# Patient Record
Sex: FEMALE | Race: WHITE | NOT HISPANIC OR LATINO | Employment: UNEMPLOYED | URBAN - METROPOLITAN AREA
[De-identification: names, ages, dates, MRNs, and addresses within clinical notes are randomized per-mention and may not be internally consistent; named-entity substitution may affect disease eponyms.]

---
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
## 2020-03-13 ENCOUNTER — HOSPITAL ENCOUNTER (EMERGENCY)
Facility: CLINIC | Age: 20
Discharge: HOME | End: 2020-03-13
Attending: EMERGENCY MEDICINE | Admitting: EMERGENCY MEDICINE
Payer: COMMERCIAL

## 2020-03-13 ENCOUNTER — APPOINTMENT (OUTPATIENT)
Dept: ULTRASOUND IMAGING | Facility: CLINIC | Age: 20
End: 2020-03-13
Attending: EMERGENCY MEDICINE
Payer: COMMERCIAL

## 2020-03-13 VITALS
RESPIRATION RATE: 16 BRPM | HEART RATE: 89 BPM | DIASTOLIC BLOOD PRESSURE: 76 MMHG | OXYGEN SATURATION: 100 % | TEMPERATURE: 97.4 F | SYSTOLIC BLOOD PRESSURE: 119 MMHG

## 2020-03-13 DIAGNOSIS — R10.32 ABDOMINAL PAIN, LEFT LOWER QUADRANT: ICD-10-CM

## 2020-03-13 DIAGNOSIS — R11.2 NON-INTRACTABLE VOMITING WITH NAUSEA, UNSPECIFIED VOMITING TYPE: ICD-10-CM

## 2020-03-13 LAB
ALBUMIN UR-MCNC: NEGATIVE MG/DL
ANION GAP SERPL CALCULATED.3IONS-SCNC: 3 MMOL/L (ref 3–14)
APPEARANCE UR: CLEAR
B-HCG FREE SERPL-ACNC: <5 IU/L
BASOPHILS # BLD AUTO: 0 10E9/L (ref 0–0.2)
BASOPHILS NFR BLD AUTO: 0.2 %
BILIRUB UR QL STRIP: NEGATIVE
BUN SERPL-MCNC: 15 MG/DL (ref 7–30)
CALCIUM SERPL-MCNC: 8.2 MG/DL (ref 8.5–10.1)
CHLORIDE SERPL-SCNC: 107 MMOL/L (ref 96–110)
CO2 SERPL-SCNC: 29 MMOL/L (ref 20–32)
COLOR UR AUTO: ABNORMAL
CREAT SERPL-MCNC: 0.76 MG/DL (ref 0.5–1)
DIFFERENTIAL METHOD BLD: ABNORMAL
EOSINOPHIL # BLD AUTO: 0.3 10E9/L (ref 0–0.7)
EOSINOPHIL NFR BLD AUTO: 1.7 %
ERYTHROCYTE [DISTWIDTH] IN BLOOD BY AUTOMATED COUNT: 12.6 % (ref 10–15)
GFR SERPL CREATININE-BSD FRML MDRD: >90 ML/MIN/{1.73_M2}
GLUCOSE SERPL-MCNC: 85 MG/DL (ref 70–99)
GLUCOSE UR STRIP-MCNC: NEGATIVE MG/DL
HCT VFR BLD AUTO: 43.3 % (ref 35–47)
HGB BLD-MCNC: 14.1 G/DL (ref 11.7–15.7)
HGB UR QL STRIP: ABNORMAL
IMM GRANULOCYTES # BLD: 0.1 10E9/L (ref 0–0.4)
IMM GRANULOCYTES NFR BLD: 0.4 %
KETONES UR STRIP-MCNC: NEGATIVE MG/DL
LEUKOCYTE ESTERASE UR QL STRIP: NEGATIVE
LYMPHOCYTES # BLD AUTO: 1.8 10E9/L (ref 0.8–5.3)
LYMPHOCYTES NFR BLD AUTO: 12.8 %
MCH RBC QN AUTO: 28.4 PG (ref 26.5–33)
MCHC RBC AUTO-ENTMCNC: 32.6 G/DL (ref 31.5–36.5)
MCV RBC AUTO: 87 FL (ref 78–100)
MONOCYTES # BLD AUTO: 1 10E9/L (ref 0–1.3)
MONOCYTES NFR BLD AUTO: 7.2 %
MUCOUS THREADS #/AREA URNS LPF: PRESENT /LPF
NEUTROPHILS # BLD AUTO: 11.1 10E9/L (ref 1.6–8.3)
NEUTROPHILS NFR BLD AUTO: 77.7 %
NITRATE UR QL: NEGATIVE
NRBC # BLD AUTO: 0 10*3/UL
NRBC BLD AUTO-RTO: 0 /100
PH UR STRIP: 6 PH (ref 5–7)
PLATELET # BLD AUTO: 313 10E9/L (ref 150–450)
POTASSIUM SERPL-SCNC: 3.7 MMOL/L (ref 3.4–5.3)
RBC # BLD AUTO: 4.97 10E12/L (ref 3.8–5.2)
RBC #/AREA URNS AUTO: 1 /HPF (ref 0–2)
SODIUM SERPL-SCNC: 139 MMOL/L (ref 133–144)
SOURCE: ABNORMAL
SP GR UR STRIP: 1.02 (ref 1–1.03)
SQUAMOUS #/AREA URNS AUTO: 12 /HPF (ref 0–1)
UROBILINOGEN UR STRIP-MCNC: NORMAL MG/DL (ref 0–2)
WBC # BLD AUTO: 14.4 10E9/L (ref 4–11)
WBC #/AREA URNS AUTO: 1 /HPF (ref 0–5)

## 2020-03-13 PROCEDURE — 80048 BASIC METABOLIC PNL TOTAL CA: CPT | Performed by: EMERGENCY MEDICINE

## 2020-03-13 PROCEDURE — 25000128 H RX IP 250 OP 636: Performed by: EMERGENCY MEDICINE

## 2020-03-13 PROCEDURE — 76856 US EXAM PELVIC COMPLETE: CPT

## 2020-03-13 PROCEDURE — 81001 URINALYSIS AUTO W/SCOPE: CPT | Performed by: EMERGENCY MEDICINE

## 2020-03-13 PROCEDURE — 25800030 ZZH RX IP 258 OP 636: Performed by: EMERGENCY MEDICINE

## 2020-03-13 PROCEDURE — 85025 COMPLETE CBC W/AUTO DIFF WBC: CPT | Performed by: EMERGENCY MEDICINE

## 2020-03-13 PROCEDURE — 96361 HYDRATE IV INFUSION ADD-ON: CPT

## 2020-03-13 PROCEDURE — 99285 EMERGENCY DEPT VISIT HI MDM: CPT | Mod: 25

## 2020-03-13 PROCEDURE — 96374 THER/PROPH/DIAG INJ IV PUSH: CPT

## 2020-03-13 PROCEDURE — 84702 CHORIONIC GONADOTROPIN TEST: CPT

## 2020-03-13 RX ORDER — ONDANSETRON 2 MG/ML
4 INJECTION INTRAMUSCULAR; INTRAVENOUS ONCE
Status: COMPLETED | OUTPATIENT
Start: 2020-03-13 | End: 2020-03-13

## 2020-03-13 RX ORDER — ONDANSETRON 4 MG/1
4 TABLET, ORALLY DISINTEGRATING ORAL EVERY 6 HOURS PRN
Qty: 10 TABLET | Refills: 0 | Status: SHIPPED | OUTPATIENT
Start: 2020-03-13 | End: 2020-03-16

## 2020-03-13 RX ORDER — ONDANSETRON 2 MG/ML
INJECTION INTRAMUSCULAR; INTRAVENOUS
Status: DISCONTINUED
Start: 2020-03-13 | End: 2020-03-13 | Stop reason: HOSPADM

## 2020-03-13 RX ADMIN — SODIUM CHLORIDE 1000 ML: 9 INJECTION, SOLUTION INTRAVENOUS at 01:04

## 2020-03-13 RX ADMIN — ONDANSETRON 4 MG: 2 INJECTION INTRAMUSCULAR; INTRAVENOUS at 01:04

## 2020-03-13 ASSESSMENT — ENCOUNTER SYMPTOMS
ABDOMINAL PAIN: 1
FEVER: 0
DIARRHEA: 0
VOMITING: 1
NAUSEA: 1
DIFFICULTY URINATING: 0
DYSURIA: 0

## 2020-03-13 NOTE — DISCHARGE INSTRUCTIONS
Discharge Instructions  Abdominal Pain    Abdominal pain (belly pain) can be caused by many things. Your evaluation today does not show the exact cause for your pain. Your provider today has decided that it is unlikely your pain is due to a life threatening problem, or a problem requiring surgery or hospital admission. Sometimes those problems cannot be found right away, so it is very important that you follow up as directed.  Sometimes only the changes which occur over time allow the cause of your pain to be found.    Generally, every Emergency Department visit should have a follow-up clinic visit with either a primary or a specialty clinic/provider. Please follow-up as instructed by your emergency provider today. With abdominal pain, we often recommend very close follow-up, such as the following day.    ADULTS:  Return to the Emergency Department right away if:    You get an oral temperature above 102oF or as directed by your provider.  You have blood in your stools. This may be bright red or appear as black, tarry stools.    You keep vomiting (throwing up) or cannot drink liquids.  You see blood when you vomit.   You cannot have a bowel movement or you cannot pass gas.  Your stomach gets bloated or bigger.  Your skin or the whites of your eyes look yellow.  You faint.  You have bloody, frequent or painful urination (peeing).  You have new symptoms or anything that worries you.    MORE INFORMATION:    Appendicitis:  A possible cause of abdominal pain in any person who still has their appendix is acute appendicitis. Appendicitis is often hard to diagnose.  Testing does not always rule out early appendicitis or other causes of abdominal pain. Close follow-up with your provider and re-evaluations may be needed to figure out the reason for your abdominal pain.    Follow-up:  It is very important that you make an appointment with your clinic and go to the appointment.  If you do not follow-up with your primary  "provider, it may result in missing an important development which could result in permanent injury or disability and/or lasting pain.  If there is any problem keeping your appointment, call your provider or return to the Emergency Department.    Medications:  Take your medications as directed by your provider today.  Before using over-the-counter medications, ask your provider and make sure to take the medications as directed.  If you have any questions about medications, ask your provider.    Diet:  Resume your normal diet as much as possible, but do not eat fried, fatty or spicy foods while you have pain.  Do not drink alcohol or have caffeine.  Do not smoke tobacco.    Probiotics: If you have been given an antibiotic, you may want to also take a probiotic pill or eat yogurt with live cultures. Probiotics have \"good bacteria\" to help your intestines stay healthy. Studies have shown that probiotics help prevent diarrhea (loose stools) and other intestine problems (including C. diff infection) when you take antibiotics. You can buy these without a prescription in the pharmacy section of the store.     If you were given a prescription for medicine here today, be sure to read all of the information (including the package insert) that comes with your prescription.  This will include important information about the medicine, its side effects, and any warnings that you need to know about.  The pharmacist who fills the prescription can provide more information and answer questions you may have about the medicine.  If you have questions or concerns that the pharmacist cannot address, please call or return to the Emergency Department.       Remember that you can always come back to the Emergency Department if you are not able to see your regular provider in the amount of time listed above, if you get any new symptoms, or if there is anything that worries you.    "

## 2020-03-13 NOTE — ED PROVIDER NOTES
History     Chief Complaint:  Abdominal Pain    The history is provided by the patient.      Gibson Prakash is a 19 year old female with a history of methamphetamine use and bipolar who presents with abdominal pain. According to the patient, earlier today she began experiencing centralized lower abdominal pain that feels like a pressure and has been intermittent since onset. She recently had her nexplanon removed, and took two pregnancy tests today, but both showed question marks. She reports bleeding for several days after removal of the nexplanon, but has not had regular periods in a long time. She denies urinary abnormalities, fevers and diarrhea, but reports one episode of vomiting. The patient says she is a meth user, but has not used in 24 hours.     Allergies:  No Known Allergies     Medications:    The patient is currently on no regular medications.     Past Medical History:    IV drug user  Depression  ADHD  Meth abuse  Asthma  Bipolar 1 disorder    Past Surgical History:    The patient does not have any pertinent past surgical history.    Family History:    Substance abuse  Depression  Anxiety  Liver disease  Bipolar disorder  Heart disease    Social History:  Smoking status: current everyday smoker  Alcohol use: no  Drug use: yes, methamphetamines  Presents to the ED with fiance  Marital Status:  Single     Review of Systems   Constitutional: Negative for fever.   Gastrointestinal: Positive for abdominal pain, nausea and vomiting. Negative for diarrhea.   Genitourinary: Positive for vaginal bleeding. Negative for difficulty urinating and dysuria.   All other systems reviewed and are negative.      Physical Exam     Patient Vitals for the past 24 hrs:   BP Temp Temp src Heart Rate Resp SpO2   03/13/20 0305 119/76 -- -- -- 16 100 %   03/13/20 0200 -- -- -- 85 19 --   03/13/20 0145 -- -- -- 84 11 --   03/13/20 0130 -- -- -- 79 17 99 %   03/13/20 0115 -- -- -- 80 16 98 %   03/13/20 0032 107/88 97.4  F  (36.3  C) Oral 89 16 98 %     Physical Exam  Nursing note and vitals reviewed.  Constitutional: Cooperative.   HENT:   Mouth/Throat: Mucous membranes are normal.   Cardiovascular: Normal rate, regular rhythm and normal heart sounds.  No murmur.  Pulmonary/Chest: Effort normal and breath sounds normal. No respiratory distress. No wheezes. No rales.   Abdominal: Soft. Normal appearance and bowel sounds are normal. No distension. Mild LLQ tenderness.  There is no rigidity and no guarding.   Neurological: Alert.   Skin: Skin is warm and dry  Psychiatric: Normal mood and affect.     Emergency Department Course     Imaging:  Radiology findings were communicated with the patient who voiced understanding of the findings.    US Pelvic Complete w Transvaginal & Abd/Pel Duplex Limited:  1.  No overt uterine or endometrial abnormality. Endometrial thickness normal at 4 mm.   2.  Neither ovary visualized due to bowel gas.   3.  No significant free fluid in the pelvis, as per radiology.      Laboratory:  Laboratory findings were communicated with the patient who voiced understanding of the findings.    CBC: WBC: 14.4 (H), HGB: 14.1, PLT: 313  BMP: Calcium 8.2 (L) o/w WNL (Creatinine 0.76)    ISTAT HCG Quantitative Pregnancy POCT <5.0     UA with Microscopic: blood large (A), squamous epithelial/HPF 12 (H), mucous present (A), o/w WNL      Interventions:  0104 NS 1L IV   0104 Zofran 4 mg IV    Emergency Department Course:  Past medical records, nursing notes, and vitals reviewed.    0037 I performed an exam of the patient as documented above.     IV was inserted and blood was drawn for laboratory testing, results above.    The patient provided a urine sample here in the emergency department. This was sent for laboratory testing, findings above.    The patient was sent for a pelvic US while in the emergency department, results above.     0305 Patient rechecked and updated.      Findings and plan explained to the Patient. Patient  discharged home with instructions regarding supportive care, medications, and reasons to return. The importance of close follow-up was reviewed. The patient was prescribed Zofran.      I personally reviewed the laboratory and imaging results with the Patient and answered all related questions prior to discharge.      Impression & Plan     Medical Decision Making:  Gibson Prakash is a 19 year old female with ongoing methamphetamine abuse who presents to the emergency department today with vomiting and left lower quadrant abdominal pain. She is concerned about pregnancy, but here the pregnancy test is negative. Urinalysis is negative, as is the ultrasound of her ovaries. No evidence of torsion or ruptured cysts. Labs other than a slightly leukocytosis are reassuring. After fluids and Zofran she feels better and would like to go home. This may simply be a viral enteritis with the vomiting. If she develops any worsening symptoms she is certainly welcome to return, but at this time I have a low clinical suspicion for a an acute surgical process. Comfortable with plan for discharge.      Discharge Diagnosis:    ICD-10-CM    1. Abdominal pain, left lower quadrant  R10.32    2. Non-intractable vomiting with nausea, unspecified vomiting type  R11.2        Disposition:  Discharged to home.       Discharge Medications:  New Prescriptions    ONDANSETRON (ZOFRAN ODT) 4 MG ODT TAB    Take 1 tablet (4 mg) by mouth every 6 hours as needed for nausea       Scribe Disclosure:  I, Aleja Lucas, am serving as a scribe at 12:37 AM on 3/13/2020 to document services personally performed by Epifanio Garcia MD based on my observations and the provider's statements to me.    3/13/2020   [facility] EMERGENCY DEPARTMENT       Epifanio Garcia MD  03/13/20 0530

## 2020-03-13 NOTE — ED AVS SNAPSHOT
[facility] Emergency Department  [address]  [facility] [address]  Phone:  [phone]  Fax:  [phone]                                    Gibson Prakash   MRN: [id number]    Department:  [facility] Emergency Department   Date of Visit:  3/13/2020           After Visit Summary Signature Page    I have received my discharge instructions, and my questions have been answered. I have discussed any challenges I see with this plan with the nurse or doctor.    ..........................................................................................................................................  Patient/Patient Representative Signature      ..........................................................................................................................................  Patient Representative Print Name and Relationship to Patient    ..................................................               ................................................  Date                                   Time    ..........................................................................................................................................  Reviewed by Signature/Title    ...................................................              ..............................................  Date                                               Time          22EPIC Rev 08/18

## 2020-03-13 NOTE — ED TRIAGE NOTES
Pt in with C/O LLQ pain onset tonight. Pt reports + nausea and episode of vomiting PTA. Pt reports she took a pregnancy test which was inconclusive.

## 2021-07-14 ENCOUNTER — TRANSFERRED RECORDS (OUTPATIENT)
Dept: HEALTH INFORMATION MANAGEMENT | Facility: CLINIC | Age: 21
End: 2021-07-14
Payer: COMMERCIAL

## 2021-07-14 LAB
C TRACH DNA SPEC QL PROBE+SIG AMP: NEGATIVE
HPV ABSTRACT: NORMAL
N GONORRHOEA DNA SPEC QL PROBE+SIG AMP: NEGATIVE
PAP-ABSTRACT: NORMAL
SPECIMEN DESCRIP: NORMAL
SPECIMEN DESCRIPTION: NORMAL

## 2022-01-02 ENCOUNTER — HOSPITAL ENCOUNTER (EMERGENCY)
Facility: CLINIC | Age: 22
Discharge: HOME | End: 2022-01-02
Attending: FAMILY MEDICINE | Admitting: FAMILY MEDICINE
Payer: COMMERCIAL

## 2022-01-02 VITALS
HEIGHT: 68 IN | SYSTOLIC BLOOD PRESSURE: 111 MMHG | DIASTOLIC BLOOD PRESSURE: 62 MMHG | RESPIRATION RATE: 16 BRPM | WEIGHT: 180 LBS | BODY MASS INDEX: 27.28 KG/M2 | HEART RATE: 78 BPM | OXYGEN SATURATION: 100 % | TEMPERATURE: 98.2 F
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
## 2022-01-02 DIAGNOSIS — S09.90XA CLOSED HEAD INJURY, INITIAL ENCOUNTER: ICD-10-CM

## 2022-01-02 DIAGNOSIS — Z33.1 PREGNANCY, INCIDENTAL: ICD-10-CM

## 2022-01-02 PROCEDURE — 99283 EMERGENCY DEPT VISIT LOW MDM: CPT | Mod: 25 | Performed by: FAMILY MEDICINE

## 2022-01-02 PROCEDURE — 76604 US EXAM CHEST: CPT | Performed by: FAMILY MEDICINE

## 2022-01-02 PROCEDURE — 76604 US EXAM CHEST: CPT | Mod: 26 | Performed by: FAMILY MEDICINE

## 2022-01-02 PROCEDURE — 76705 ECHO EXAM OF ABDOMEN: CPT | Mod: 26 | Performed by: FAMILY MEDICINE

## 2022-01-02 PROCEDURE — 99284 EMERGENCY DEPT VISIT MOD MDM: CPT | Mod: 25 | Performed by: FAMILY MEDICINE

## 2022-01-02 PROCEDURE — 76705 ECHO EXAM OF ABDOMEN: CPT | Performed by: FAMILY MEDICINE

## 2022-01-02 ASSESSMENT — ENCOUNTER SYMPTOMS
SINUS PRESSURE: 0
HEADACHES: 1
SORE THROAT: 0
NAUSEA: 0
DYSURIA: 0
ABDOMINAL PAIN: 0
DIAPHORESIS: 0
VOMITING: 0
DIARRHEA: 0
PALPITATIONS: 0
FREQUENCY: 0
WHEEZING: 0
BLOOD IN STOOL: 0
FEVER: 0
CHILLS: 0
CONSTIPATION: 0
COUGH: 0
SHORTNESS OF BREATH: 0

## 2022-01-02 ASSESSMENT — MIFFLIN-ST. JEOR: SCORE: 1629.97
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
## 2022-02-10 ENCOUNTER — TELEPHONE (OUTPATIENT)
Dept: OBGYN | Facility: CLINIC | Age: 22
End: 2022-02-10
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
## 2022-02-16 ENCOUNTER — APPOINTMENT (OUTPATIENT)
Dept: OBGYN | Facility: CLINIC | Age: 22
End: 2022-02-16
Payer: COMMERCIAL

## 2022-02-16 ENCOUNTER — PRENATAL OFFICE VISIT (OUTPATIENT)
Dept: OBGYN | Facility: CLINIC | Age: 22
End: 2022-02-16

## 2022-02-16 DIAGNOSIS — Z34.00 PRENATAL CARE, FIRST PREGNANCY: ICD-10-CM

## 2022-02-16 PROCEDURE — 99207 PR NO CHARGE NURSE ONLY: CPT | Performed by: OBSTETRICS & GYNECOLOGY

## 2022-02-16 RX ORDER — PRENATAL VIT/IRON FUM/FOLIC AC 27MG-0.8MG
1 TABLET ORAL DAILY
COMMUNITY
End: 2022-09-02

## 2022-02-17 ENCOUNTER — PRENATAL OFFICE VISIT (OUTPATIENT)
Dept: OBGYN | Facility: CLINIC | Age: 22
End: 2022-02-17
Payer: COMMERCIAL

## 2022-02-17 VITALS
DIASTOLIC BLOOD PRESSURE: 74 MMHG | HEIGHT: 68 IN | WEIGHT: 191.3 LBS | RESPIRATION RATE: 12 BRPM | HEART RATE: 82 BPM | SYSTOLIC BLOOD PRESSURE: 120 MMHG | BODY MASS INDEX: 28.99 KG/M2 | TEMPERATURE: 98.4 F

## 2022-02-17 DIAGNOSIS — N89.8 VAGINAL DISCHARGE: ICD-10-CM

## 2022-02-17 DIAGNOSIS — Z34.02 ENCOUNTER FOR PRENATAL CARE IN SECOND TRIMESTER OF FIRST PREGNANCY: Primary | ICD-10-CM

## 2022-02-17 DIAGNOSIS — N76.0 BV (BACTERIAL VAGINOSIS): Primary | ICD-10-CM

## 2022-02-17 DIAGNOSIS — B96.89 BV (BACTERIAL VAGINOSIS): Primary | ICD-10-CM

## 2022-02-17 LAB
AMPHETAMINES UR QL SCN: NORMAL
AMPHETAMINES UR QL: NOT DETECTED
BARBITURATES UR QL SCN: NOT DETECTED
BARBITURATES UR QL: NORMAL
BENZODIAZ UR QL SCN: NOT DETECTED
BENZODIAZ UR QL: NORMAL
BUPRENORPHINE UR QL: NOT DETECTED
CANNABINOIDS UR QL SCN: NORMAL
CANNABINOIDS UR QL: NOT DETECTED
CLUE CELLS: PRESENT
COCAINE UR QL SCN: NOT DETECTED
COCAINE UR QL: NORMAL
D-METHAMPHET UR QL: NOT DETECTED
METHADONE UR QL SCN: NOT DETECTED
OPIATES UR QL SCN: NORMAL
OPIATES UR QL SCN: NOT DETECTED
OXYCODONE UR QL SCN: NOT DETECTED
PCP UR QL SCN: NORMAL
PCP UR QL SCN: NOT DETECTED
PROPOXYPH UR QL: NOT DETECTED
TRICHOMONAS, WET PREP: ABNORMAL
TRICYCLICS UR QL SCN: NOT DETECTED
WBC'S/HIGH POWER FIELD, WET PREP: ABNORMAL
YEAST, WET PREP: ABNORMAL

## 2022-02-17 PROCEDURE — 81511 FTL CGEN ABNOR FOUR ANAL: CPT | Mod: 90 | Performed by: OBSTETRICS & GYNECOLOGY

## 2022-02-17 PROCEDURE — 87491 CHLMYD TRACH DNA AMP PROBE: CPT | Performed by: OBSTETRICS & GYNECOLOGY

## 2022-02-17 PROCEDURE — 99207 PR FIRST OB VISIT: CPT | Performed by: OBSTETRICS & GYNECOLOGY

## 2022-02-17 PROCEDURE — 80306 DRUG TEST PRSMV INSTRMNT: CPT | Performed by: OBSTETRICS & GYNECOLOGY

## 2022-02-17 PROCEDURE — 87389 HIV-1 AG W/HIV-1&-2 AB AG IA: CPT | Performed by: OBSTETRICS & GYNECOLOGY

## 2022-02-17 PROCEDURE — 87591 N.GONORRHOEAE DNA AMP PROB: CPT | Performed by: OBSTETRICS & GYNECOLOGY

## 2022-02-17 PROCEDURE — 87210 SMEAR WET MOUNT SALINE/INK: CPT | Performed by: OBSTETRICS & GYNECOLOGY

## 2022-02-17 PROCEDURE — 36415 COLL VENOUS BLD VENIPUNCTURE: CPT | Performed by: OBSTETRICS & GYNECOLOGY

## 2022-02-17 PROCEDURE — 99000 SPECIMEN HANDLING OFFICE-LAB: CPT | Performed by: OBSTETRICS & GYNECOLOGY

## 2022-02-17 PROCEDURE — 80307 DRUG TEST PRSMV CHEM ANLYZR: CPT | Performed by: OBSTETRICS & GYNECOLOGY

## 2022-02-17 PROCEDURE — 86803 HEPATITIS C AB TEST: CPT | Performed by: OBSTETRICS & GYNECOLOGY

## 2022-02-17 RX ORDER — METRONIDAZOLE 7.5 MG/G
1 GEL VAGINAL AT BEDTIME
Qty: 25 G | Refills: 0 | Status: SHIPPED | OUTPATIENT
Start: 2022-02-17 | End: 2022-02-22

## 2022-02-17 NOTE — NURSING NOTE
"Initial /74 (BP Location: Right arm, Patient Position: Sitting, Cuff Size: Adult Large)   Pulse 82   Temp 98.4  F (36.9  C) (Tympanic)   Resp 12   Ht 1.727 m (5' 8\")   Wt 86.8 kg (191 lb 4.8 oz)   LMP 10/31/2021   BMI 29.09 kg/m   Estimated body mass index is 29.09 kg/m  as calculated from the following:    Height as of this encounter: 1.727 m (5' 8\").    Weight as of this encounter: 86.8 kg (191 lb 4.8 oz). .      "

## 2022-02-17 NOTE — LETTER
[facility] [place]  [address] [place] [address]  Phone: [phone]      February 18, 2022      Gibson Prakash  [address]  [place] [address]              To whom it may concern:    Gibson Prakash is being seen in our clinic for prenatal care.  Her Estimated Date of Delivery: Jul 15, 2022.  Her Last menstrual period was Patient's last menstrual period was 10/31/2021..      Sincerely,    Michelle Card DO

## 2022-02-17 NOTE — PROGRESS NOTES
Patient is transfer from [facility] She has asked for her records to be faxed to us Patient stated she ws to be inpatient for drug abuse ( meth) for 4- 7 weeks but left yesterday after just 1 week She stated something about doing a outpatient program ? She said her last use of Meth was 1/16/22  [facility] OB Intake Nurse    Patient supplied answers from flow sheet for:  Prenatal OB Questionnaire.  Past Medical History  Have you ever recieved care for your mental health? : (!) Yes  Have you ever been in a major accident or suffered serious trauma?: No  Within the last year, has anyone hit, slapped, kicked or otherwise hurt you?: No  In the last year, has anyone forced you to have sex when you didn't want to?: No    Past Medical History 2   Have you ever received a blood transfusion?: No  Would you accept a blood transfusion if was medically recommended?: Yes  Does anyone in your home smoke?: (!) Yes (patient)   Is your blood type Rh negative?: No  Have you ever ?: No  Have you been hospitalized for a nonsurgical reason excluding normal delivery?: (!) Yes (for substance abuse)  Have you ever had an abnormal pap smear?: No    Past Medical History (Continued)  Do you have a history of abnormalities of the uterus?: No  Did your mother take LETICIA or any other hormones when she was pregnant with you?: No  Do you have any other problems we have not asked about which you feel may be important to this pregnancy?: No

## 2022-02-18 ENCOUNTER — HOSPITAL ENCOUNTER (OUTPATIENT)
Dept: ULTRASOUND IMAGING | Facility: CLINIC | Age: 22
Discharge: HOME | End: 2022-02-18
Attending: OBSTETRICS & GYNECOLOGY | Admitting: OBSTETRICS & GYNECOLOGY
Payer: COMMERCIAL

## 2022-02-18 DIAGNOSIS — Z34.02 ENCOUNTER FOR PRENATAL CARE IN SECOND TRIMESTER OF FIRST PREGNANCY: ICD-10-CM

## 2022-02-18 LAB
C TRACH DNA SPEC QL PROBE+SIG AMP: NEGATIVE
HCV AB SERPL QL IA: NONREACTIVE
HIV 1+2 AB+HIV1 P24 AG SERPL QL IA: NONREACTIVE
N GONORRHOEA DNA SPEC QL NAA+PROBE: NEGATIVE

## 2022-02-18 PROCEDURE — 76805 OB US >/= 14 WKS SNGL FETUS: CPT

## 2022-02-18 NOTE — RESULT ENCOUNTER NOTE
Will forward to [facility] pool for pt notification of normal result.    Radha Green   Ob/Gyn Clinic  RN

## 2022-02-18 NOTE — PROGRESS NOTES
[facility] OB/GYN Clinic    New OB Visit Note    CC: New OB     Subjective:    Gibson is a 21 year old  at 18w6d by 9w5d US who presents for her initial OB visit. Patient had an initial prenatal intake at [facility] but is transferring care. She reports she has been feeling well. Denies any vaginal bleeding, uterine cramping, or leakage of fluid. Not definitively feeling any fetal movement. Only concern today is some clear vaginal discharge with foul odor.     Past OB History:     OB History    Para Term  AB Living   1 0 0 0 0 0   SAB IAB Ectopic Multiple Live Births   0 0 0 0 0      # Outcome Date GA Lbr [place]/2nd Weight Sex Delivery Anes PTL Lv   1 Current                Past Medical History:   Diagnosis Date     ADHD      Anxiety      Asthma      Bipolar disorder      Depression      History of urinary tract infection      IV drug user      Methamphetamine abuse      Oppositional defiant disorder        Past Surgical History:   Procedure Laterality Date     NO HISTORY OF SURGERY         Current Outpatient Medications   Medication Sig Dispense Refill     Prenatal Vit-Fe Fumarate-FA (PRENATAL MULTIVITAMIN W/IRON) 27-0.8 MG tablet Take 1 tablet by mouth daily (Patient not taking: Reported on 2022)         Family History   Problem Relation Age of Onset     Substance Abuse Mother         drugs     Depression Mother      Anxiety Disorder Mother      Substance Abuse Father         drugs     Depression Father      Heart Disease Maternal Grandmother         MIx3     Substance Abuse Paternal Grandmother         alcohol     Substance Abuse Paternal Grandfather         alcohol     Liver Disease Paternal Grandfather      Alcoholism Paternal Grandfather      Substance Abuse Maternal Uncle      Depression Maternal Uncle      Anxiety Disorder Maternal Uncle      Bipolar Disorder Maternal Uncle      Substance Abuse Paternal Uncle        Social History     Tobacco Use     Smoking status:  "Current Every Day Smoker     Packs/day: 1.00     Types: Cigarettes     Smokeless tobacco: Never Used     Tobacco comment: down to 2-3 cig/day or vaping ? with pregnancy   Substance Use Topics     Alcohol use: No       ROS: A 10 pt ROS was completed and found to be negative unless mentioned in the HPI.     Objective:    /74 (BP Location: Right arm, Patient Position: Sitting, Cuff Size: Adult Large)   Pulse 82   Temp 98.4  F (36.9  C) (Tympanic)   Resp 12   Ht 1.727 m (5' 8\")   Wt 86.8 kg (191 lb 4.8 oz)   LMP 10/31/2021   BMI 29.09 kg/m      Estimated body mass index is 29.09 kg/m  as calculated from the following:    Height as of this encounter: 1.727 m (5' 8\").    Weight as of this encounter: 86.8 kg (191 lb 4.8 oz).    General appearance: well-hydrated, A&O x 3, no apparent distress  Lungs: Equal expansion bilaterally, no accessory muscle use  Heart: No heaves or thrills. No peripheral varicosities  Constitutional: See vitals  Abdomen: Soft, non-tender, non-distended. No rebound, rigidity, or guarding.  Extremities: no edema  Genitourinary:  External genitalia: no erythema, no lesions.   Anus and Perineum: Unremarkable, no visible lesions  Vagina: Normal, healthy pink mucosa without any lesions. Physiologic vaginal discharge.   Cervix: normal appearance, no cervical motion tenderness.       FHT: 150bpm     Assessment and Plan:     Encounter Diagnoses   Name Primary?     Encounter for prenatal care in second trimester of first pregnancy      Vaginal discharge Yes       21 year old  at 18w6d by 9w5d US who presents for her initial OB visit.     -Dating by 9w5d US, unknown LMP  -Some NOB labs obtained at initial intake with [facility]. Will complete collection today. UDS for history of meth use.  -Genetic screening: quad screen ordered today  -Anatomy US ordered  -Pelvic cultures for complaint of vaginal discharge    -History of substance abuse: primarily meth, reports she has been sober since " finding out she was pregnant. UDS today. Reports she follows with [facility] for probation. CPS is involved in her care. She has been submitting monthly drug screens. Has been enrolled in a substance abuse rehab program which is starting next week.   -E-cig use: discussed risks of smoking in pregnancy, she has been cutting back.      Return to clinic in 4 weeks.       Michelle Card DO

## 2022-02-20 LAB
# FETUSES US: NORMAL
AFP MOM SERPL: 1
AFP SERPL-MCNC: 44 NG/ML
AGE - REPORTED: 22.1 YR
CURRENT SMOKER: YES
FAMILY MEMBER DISEASES HX: NO
GA METHOD: NORMAL
GA: NORMAL WK
HCG MOM SERPL: 1.56
HCG SERPL-ACNC: NORMAL IU/L
HX OF HEREDITARY DISORDERS: NO
IDDM PATIENT QL: NO
INHIBIN A MOM SERPL: 0.85
INHIBIN A SERPL-MCNC: 163 PG/ML
INTEGRATED SCN PATIENT-IMP: NORMAL
PATHOLOGY STUDY: NORMAL
SPECIMEN DRAWN SERPL: NORMAL
U ESTRIOL MOM SERPL: 1.27
U ESTRIOL SERPL-MCNC: 2.13 NG/ML

## 2022-03-08 ENCOUNTER — TELEPHONE (OUTPATIENT)
Dept: OBGYN | Facility: CLINIC | Age: 22
End: 2022-03-08
Payer: COMMERCIAL

## 2022-03-16 ENCOUNTER — PRENATAL OFFICE VISIT (OUTPATIENT)
Dept: OBGYN | Facility: CLINIC | Age: 22
End: 2022-03-16
Payer: COMMERCIAL

## 2022-03-16 VITALS
TEMPERATURE: 97.7 F | RESPIRATION RATE: 18 BRPM | HEART RATE: 97 BPM | BODY MASS INDEX: 29.8 KG/M2 | HEIGHT: 68 IN | DIASTOLIC BLOOD PRESSURE: 65 MMHG | SYSTOLIC BLOOD PRESSURE: 115 MMHG | WEIGHT: 196.6 LBS

## 2022-03-16 DIAGNOSIS — Z34.03 ENCOUNTER FOR PRENATAL CARE IN THIRD TRIMESTER OF FIRST PREGNANCY: Primary | ICD-10-CM

## 2022-03-16 PROCEDURE — 99207 PR PRENATAL VISIT: CPT | Performed by: ADVANCED PRACTICE MIDWIFE

## 2022-03-16 NOTE — PROGRESS NOTES
"Here with \"baby daddy.\"  Feeling well.  Baby is active. Denies any leaking of fluid, vaginal bleeding, regular uterine contractions, or headaches or other concerns.  Discussed GCT and labs at next visit.    Reviewed to call for contractions, loss of fluid, vaginal bleeding, decreased fetal movement or any other questions or concerns.    RTC in 4 weeks.  Ayde Lokcett, ROCHELLE, APRN, CNM               "

## 2022-03-29 ENCOUNTER — OFFICE VISIT (OUTPATIENT)
Dept: URGENT CARE | Facility: URGENT CARE | Age: 22
End: 2022-03-29
Payer: COMMERCIAL

## 2022-03-29 VITALS
OXYGEN SATURATION: 99 % | WEIGHT: 206.2 LBS | RESPIRATION RATE: 22 BRPM | HEART RATE: 83 BPM | DIASTOLIC BLOOD PRESSURE: 69 MMHG | BODY MASS INDEX: 31.35 KG/M2 | TEMPERATURE: 97 F | SYSTOLIC BLOOD PRESSURE: 101 MMHG

## 2022-03-29 DIAGNOSIS — M54.50 ACUTE LEFT-SIDED LOW BACK PAIN WITHOUT SCIATICA: Primary | ICD-10-CM

## 2022-03-29 LAB
ALBUMIN UR-MCNC: NEGATIVE MG/DL
AMORPH CRY #/AREA URNS HPF: ABNORMAL /HPF
APPEARANCE UR: ABNORMAL
BACTERIA #/AREA URNS HPF: ABNORMAL /HPF
BILIRUB UR QL STRIP: NEGATIVE
COLOR UR AUTO: YELLOW
GLUCOSE UR STRIP-MCNC: NEGATIVE MG/DL
HGB UR QL STRIP: NEGATIVE
KETONES UR STRIP-MCNC: NEGATIVE MG/DL
LEUKOCYTE ESTERASE UR QL STRIP: NEGATIVE
NITRATE UR QL: NEGATIVE
PH UR STRIP: 7 [PH] (ref 5–7)
RBC #/AREA URNS AUTO: ABNORMAL /HPF
SP GR UR STRIP: 1.02 (ref 1–1.03)
SQUAMOUS #/AREA URNS AUTO: ABNORMAL /LPF
UROBILINOGEN UR STRIP-ACNC: 0.2 E.U./DL
WBC #/AREA URNS AUTO: ABNORMAL /HPF

## 2022-03-29 PROCEDURE — 99213 OFFICE O/P EST LOW 20 MIN: CPT | Performed by: STUDENT IN AN ORGANIZED HEALTH CARE EDUCATION/TRAINING PROGRAM

## 2022-03-29 PROCEDURE — 81001 URINALYSIS AUTO W/SCOPE: CPT | Performed by: STUDENT IN AN ORGANIZED HEALTH CARE EDUCATION/TRAINING PROGRAM

## 2022-03-29 NOTE — LETTER
[facility] CARE [address] [address]  Phone: [phone]    March 29, 2022        Gibson Prakash  [address]  [place] [address]          To whom it may concern:    RE: Gibson Prakash    Patient was seen and treated today at our clinic for left low back pain due to muscle strain.     Please contact me for questions or concerns.      Sincerely,        PHYLLIS Fitzpatrick CNP

## 2022-03-29 NOTE — PROGRESS NOTES
Assessment & Plan     1. Acute left-sided low back pain without sciatica  Patient reports a 3 day history of flank pain, negative for CVA tenderness, dysuria, fever and chills. Due to patients current pregnancy urinary tract infection needs to be ruled out. UA negative for infection. Assessment shows reproducible left sided lower back pain that increases with bending and laying flat in bed suggesting MSK etiology. Discussed at length how the body changes and stretches during pregnancy, that feeling aches and pains is very normal especially as the baby grows later in the 2nd and 3rd trimester. Encouraged patient to buy a belly band, use warm application and use tylenol for pain management. Also suggested gental stretching as tolerated. Patient stated understanding and will follow up with OB as needed.  - UA with Microscopic reflex to Culture - Clinic Collect  - Urine Microscopic         No follow-ups on file.    Danielle Mckoy, PHYLLIS [facility] is a 21 year old female who presents to clinic today for the following health issues:  Chief Complaint   Patient presents with     Kidney Problem     pretty sure have kidney infeciton d/t having back pain for a couple days now     HPI    Patient reports a 3 day history of left sided lower back pain, she states that it hurst worse when she is laying down and sometimes her boyfriend has to help her get up out of bed because it hurst so much. She denies vaginal or urinary pain.  Back Pain    Onset of symptoms was 3 day(s) ago.  Location: left low back  Radiation: does not radiate  Context:       The injury happened while N/A      Mechanism: none recalled by the patient      Patient experienced N/A  Course of symptoms is same.    Severity moderate  Current and Associated symptoms: pain  Denies: fecal incontinence, urinary incontinence, lower extremity numbness, lower extremity weakness and paresthesia     Aggravating Factors: lying  Therapies to improve symptoms include: none tried  Past history: no prior back problems  Patient is currently 25w2d    Review of Systems  Constitutional, HEENT, cardiovascular, pulmonary, gi and gu systems are negative, except as otherwise noted.      Objective    /69 (BP Location: Left arm, Patient Position: Sitting, Cuff Size: Adult Large)   Pulse 83   Temp 97  F (36.1  C) (Tympanic)   Resp 22   Wt 93.5 kg (206 lb 3.2 oz)   LMP 10/31/2021   SpO2 99%   BMI 31.35 kg/m    Physical Exam   GENERAL: healthy, alert and no distress  NECK: no adenopathy, no asymmetry, masses, or scars and thyroid normal to palpation  RESP: lungs clear to auscultation - no rales, rhonchi or wheezes  CV: regular rate and rhythm, normal S1 S2, no S3 or S4, no murmur, click or rub, no peripheral edema and peripheral pulses strong  ABDOMEN: soft, nontender, no hepatosplenomegaly, no masses and bowel sounds normal,no CVA tenderness  MS: no gross musculoskeletal defects noted, no edema  MS: normal range of motion and no edema

## 2022-05-05 ENCOUNTER — PRENATAL OFFICE VISIT (OUTPATIENT)
Dept: OBGYN | Facility: CLINIC | Age: 22
End: 2022-05-05
Payer: COMMERCIAL

## 2022-05-05 VITALS
HEART RATE: 72 BPM | WEIGHT: 225 LBS | TEMPERATURE: 98.6 F | BODY MASS INDEX: 34.21 KG/M2 | SYSTOLIC BLOOD PRESSURE: 129 MMHG | DIASTOLIC BLOOD PRESSURE: 64 MMHG

## 2022-05-05 DIAGNOSIS — Z34.03 ENCOUNTER FOR PRENATAL CARE IN THIRD TRIMESTER OF FIRST PREGNANCY: ICD-10-CM

## 2022-05-05 LAB
ERYTHROCYTE [DISTWIDTH] IN BLOOD BY AUTOMATED COUNT: 13.1 % (ref 10–15)
GLUCOSE 1H P 50 G GLC PO SERPL-MCNC: 107 MG/DL (ref 70–129)
HCT VFR BLD AUTO: 34.4 % (ref 35–47)
HGB BLD-MCNC: 11.1 G/DL (ref 11.7–15.7)
MCH RBC QN AUTO: 29.5 PG (ref 26.5–33)
MCHC RBC AUTO-ENTMCNC: 32.3 G/DL (ref 31.5–36.5)
MCV RBC AUTO: 92 FL (ref 78–100)
PLATELET # BLD AUTO: 288 10E3/UL (ref 150–450)
RBC # BLD AUTO: 3.76 10E6/UL (ref 3.8–5.2)
WBC # BLD AUTO: 12.4 10E3/UL (ref 4–11)

## 2022-05-05 PROCEDURE — 36415 COLL VENOUS BLD VENIPUNCTURE: CPT | Performed by: OBSTETRICS & GYNECOLOGY

## 2022-05-05 PROCEDURE — 86780 TREPONEMA PALLIDUM: CPT | Performed by: OBSTETRICS & GYNECOLOGY

## 2022-05-05 PROCEDURE — 82950 GLUCOSE TEST: CPT | Performed by: OBSTETRICS & GYNECOLOGY

## 2022-05-05 PROCEDURE — 99207 PR PRENATAL VISIT: CPT | Performed by: OBSTETRICS & GYNECOLOGY

## 2022-05-05 PROCEDURE — 85027 COMPLETE CBC AUTOMATED: CPT | Performed by: OBSTETRICS & GYNECOLOGY

## 2022-05-05 NOTE — NURSING NOTE
"Initial /64 (BP Location: Right arm, Patient Position: Chair, Cuff Size: Adult Regular)   Pulse 72   Temp 98.6  F (37  C) (Tympanic)   Wt 102.1 kg (225 lb)   LMP 10/31/2021   Breastfeeding No   BMI 34.21 kg/m   Estimated body mass index is 34.21 kg/m  as calculated from the following:    Height as of 3/16/22: 1.727 m (5' 8\").    Weight as of this encounter: 102.1 kg (225 lb). .    Stephanie Herring MA    "

## 2022-05-06 LAB — T PALLIDUM AB SER QL: NONREACTIVE

## 2022-05-09 ENCOUNTER — TELEPHONE (OUTPATIENT)
Dept: OBGYN | Facility: CLINIC | Age: 22
End: 2022-05-09
Payer: COMMERCIAL

## 2022-05-09 NOTE — TELEPHONE ENCOUNTER
Return call to patient to notify of below per Dr. Garrett regarding lab results.    Radha Green   Ob/Gyn Clinic  RN    notify patient of results  The glucose test and blood counts are normal  Linda Garrett MD  [facility]

## 2022-05-09 NOTE — TELEPHONE ENCOUNTER
Reason for Call:  Other call back and returning call    Detailed comments: Pt returning a call to get test results from last Thursday's Glucose Testing.     Phone Number Patient can be reached at: Cell number on file:    Telephone Information:   Mobile [phone]       Best Time:       Can we leave a detailed message on this number? YES    Call taken on 5/9/2022 at 11:55 AM by Ana Laura Hardin MA

## 2022-05-09 NOTE — RESULT ENCOUNTER NOTE
Call to pt to notify of below.  Unable to reach.  Left message for pt to call back     Radha Green   [facility]  RN

## 2022-05-09 NOTE — RESULT ENCOUNTER NOTE
Pt notified of below.  Pt reports understanding.  Pt does not have further questions or concerns.    Radha Green   [facility]  RN     This was an audio and video enabled telemedicine encounter.  Chief Complaint  Ear Pain, left (Throbbing pain ); Headache (Sinus/Migraine sharp pain, tender/sore to the touch on top of head); Sore Throat (Swallowing feels like its bulding up from her neck to her ear with a lot of pain.); and Chills    Subjective          Autumn Muñoz presents to [facility]  History of Present Illness   Ear Pain, left (Throbbing pain ); Headache (Sinus/Migraine sharp pain, tender/sore to the touch on top of head); Sore Throat (Swallowing feels like its bulding up from her neck to her ear with a lot of pain.); and Chills    Pt states that every thing started 2 days ago. Pt has been drinking  Theraflu and using nasal spray when using she has green discharge that comes out. Pt states that she is fully vaccinated, doesn't work and doesn't come out for anything. States she has been vaccinated against COVID. Denies sick contacts.   She  has a past medical history of Arthritis, Asthma, Chronic allergic rhinitis, COPD (chronic obstructive pulmonary disease) (HCC), GERD (gastroesophageal reflux disease), Headache, High cholesterol, Hyperlipemia, Limb pain, Limb swelling, Myofascial muscle pain, Recurrent sinus infections, Jaden's edema of vocal folds, SOB (shortness of breath), Thyroid nodule, Tinnitus, bilateral, Tobacco abuse, and Voice hoarseness.     Objective   Vital Signs:   There were no vitals taken for this visit.    Physical Exam  Constitutional:       Appearance: She is obese.   Neurological:      General: No focal deficit present.      Mental Status: She is alert and oriented to person, place, and time.   Psychiatric:         Mood and Affect: Mood normal.         Behavior: Behavior normal.        Result Review :            Past Surgical History:   Procedure Laterality Date   •  SECTION     • UTERINE FIBROID EMBOLIZATION        Family History   Problem Relation Age of Onset   •  Arthritis Father    • Breast cancer Sister 20   • Colon cancer Maternal Grandmother 30   • Diabetes Other    • Breast cancer Other         Current Outpatient Medications:   •  amoxicillin-clavulanate (Augmentin) 875-125 MG per tablet, Take 1 tablet by mouth 2 (Two) Times a Day for 10 days., Disp: 20 tablet, Rfl: 0  •  aspirin 325 MG tablet, aspirin 325 mg oral tablet take 1 tablet (325 mg) by oral route once daily   Active, Disp: , Rfl:   •  atorvastatin (LIPITOR) 20 MG tablet, Take 20 mg by mouth every night at bedtime., Disp: , Rfl:   •  Azelastine HCl 137 MCG/SPRAY solution, USE 2 SPRAY(S) IN EACH NOSTRIL TWICE DAILY, Disp: , Rfl:   •  cetirizine (zyrTEC) 10 MG tablet, Take 1 tablet by mouth Daily. FOR 30 DAYS, Disp: 90 tablet, Rfl: 1  •  diclofenac (VOLTAREN) 50 MG EC tablet, Take 1 tablet by mouth 2 (Two) Times a Day., Disp: 60 tablet, Rfl: 0  •  DULoxetine (CYMBALTA) 60 MG capsule, Take 1 capsule by mouth Daily., Disp: 90 capsule, Rfl: 1  •  levocetirizine (XYZAL) 5 MG tablet, TAKE 1 TABLET BY MOUTH ONCE DAILY IN THE EVENING, Disp: 30 tablet, Rfl: 5  •  methocarbamol (ROBAXIN) 500 MG tablet, Take 1 tablet by mouth 3 (Three) Times a Day As Needed for Muscle Spasms., Disp: 60 tablet, Rfl: 0  •  methylPREDNISolone (MEDROL) 4 MG dose pack, Take as directed on package instructions., Disp: 21 each, Rfl: 0  •  montelukast (SINGULAIR) 10 MG tablet, Take 10 mg by mouth Every Evening., Disp: , Rfl:   •  nicotine polacrilex (NICORETTE) 4 MG gum, Chew 1 each As Needed for Smoking Cessation. chew 1 piece of gum (4 mg) by oral route every 2 hours as needed and as directed, Disp: 1 each, Rfl: 2  •  omeprazole (priLOSEC) 40 MG capsule, Take 1 capsule by mouth Daily., Disp: 90 capsule, Rfl: 1  •  Zinc 15 MG capsule, Take 1 tablet by mouth Daily for 90 days., Disp: 90 each, Rfl: 0   Assessment and Plan    Diagnoses and all orders for this visit:    1. Recurrent sinus infections (Primary)  -     amoxicillin-clavulanate  (Augmentin) 875-125 MG per tablet; Take 1 tablet by mouth 2 (Two) Times a Day for 10 days.  Dispense: 20 tablet; Refill: 0  -     methylPREDNISolone (MEDROL) 4 MG dose pack; Take as directed on package instructions.  Dispense: 21 each; Refill: 0    2. Preventative health care  -     Zinc 15 MG capsule; Take 1 tablet by mouth Daily for 90 days.  Dispense: 90 each; Refill: 0    3. Seasonal allergies  -     cetirizine (zyrTEC) 10 MG tablet; Take 1 tablet by mouth Daily. FOR 30 DAYS  Dispense: 90 tablet; Refill: 1    4. Chronic low back pain, unspecified back pain laterality, unspecified whether sciatica present  -     MRI Lumbar Spine Without Contrast; Future    5. Abnormal x-ray of lumbar spine  -     MRI Lumbar Spine Without Contrast; Future        Follow Up   Return if symptoms worsen or fail to improve.  Patient was given instructions and counseling regarding her condition or for health maintenance advice. Please see specific information pulled into the AVS if appropriate.                   EDDIE Barron

## 2022-05-15 ENCOUNTER — HEALTH MAINTENANCE LETTER (OUTPATIENT)
Age: 22
End: 2022-05-15

## 2022-05-24 ENCOUNTER — PRENATAL OFFICE VISIT (OUTPATIENT)
Dept: OBGYN | Facility: CLINIC | Age: 22
End: 2022-05-24
Payer: COMMERCIAL

## 2022-05-24 VITALS
BODY MASS INDEX: 35.58 KG/M2 | SYSTOLIC BLOOD PRESSURE: 109 MMHG | HEIGHT: 68 IN | RESPIRATION RATE: 16 BRPM | DIASTOLIC BLOOD PRESSURE: 71 MMHG | TEMPERATURE: 97.3 F | HEART RATE: 91 BPM | WEIGHT: 234.8 LBS

## 2022-05-24 DIAGNOSIS — Z34.03 ENCOUNTER FOR PRENATAL CARE IN THIRD TRIMESTER OF FIRST PREGNANCY: Primary | ICD-10-CM

## 2022-05-24 PROCEDURE — 90715 TDAP VACCINE 7 YRS/> IM: CPT | Performed by: OBSTETRICS & GYNECOLOGY

## 2022-05-24 PROCEDURE — 90471 IMMUNIZATION ADMIN: CPT | Performed by: OBSTETRICS & GYNECOLOGY

## 2022-05-24 PROCEDURE — 99207 PR PRENATAL VISIT: CPT | Performed by: OBSTETRICS & GYNECOLOGY

## 2022-05-24 NOTE — PROGRESS NOTES
Prior to immunization administration, verified patients identity using patient s name and date of birth. Please see Immunization Activity for additional information.     Screening Questionnaire for Adult Immunization    Are you sick today?   No   Do you have allergies to medications, food, a vaccine component or latex?   No   Have you ever had a serious reaction after receiving a vaccination?   No   Do you have a long-term health problem with heart, lung, kidney, or metabolic disease (e.g., diabetes), asthma, a blood disorder, no spleen, complement component deficiency, a cochlear implant, or a spinal fluid leak?  Are you on long-term aspirin therapy?   No   Do you have cancer, leukemia, HIV/AIDS, or any other immune system problem?   No   Do you have a parent, brother, or sister with an immune system problem?   No   In the past 3 months, have you taken medications that affect  your immune system, such as prednisone, other steroids, or anticancer drugs; drugs for the treatment of rheumatoid arthritis, Crohn s disease, or psoriasis; or have you had radiation treatments?   No   Have you had a seizure, or a brain or other nervous system problem?   No   During the past year, have you received a transfusion of blood or blood    products, or been given immune (gamma) globulin or antiviral drug?   No   For women: Are you pregnant or is there a chance you could become       pregnant during the next month?   yes   Have you received any vaccinations in the past 4 weeks?   No          Per orders of Dr. Lopez, injection of tdap given by Stacia Mckeon CMA. Patient instructed to remain in clinic for 15 minutes afterwards, and to report any adverse reaction to me immediately.       Screening performed by Stacia Mckeon CMA on 5/24/2022 at 2:38 PM.

## 2022-05-24 NOTE — PROGRESS NOTES
Handed patient [employer] Questionnaire at today's visit.  Stacia Mckeon CMA on 5/24/2022 at 2:37 PM

## 2022-05-24 NOTE — PROGRESS NOTES
Concerns: restless legs  No vaginal bleeding, LOF.  No contractions.  Cephalic position confirmed by Leopold maneuvers.  Reportable signs and symptoms discussed.  Discussed kick counts and fetal movement.  Discussed PTL, PROM, and when to call or come in.  Ferrous gluconate 1 tab po daily  RTC 2 weeks.    Eran Lopez MD

## 2022-06-09 ENCOUNTER — PRENATAL OFFICE VISIT (OUTPATIENT)
Dept: OBGYN | Facility: CLINIC | Age: 22
End: 2022-06-09
Payer: COMMERCIAL

## 2022-06-09 VITALS
WEIGHT: 242.8 LBS | SYSTOLIC BLOOD PRESSURE: 126 MMHG | DIASTOLIC BLOOD PRESSURE: 59 MMHG | RESPIRATION RATE: 16 BRPM | BODY MASS INDEX: 36.8 KG/M2 | HEART RATE: 82 BPM | TEMPERATURE: 97.5 F | HEIGHT: 68 IN

## 2022-06-09 DIAGNOSIS — Z34.03 ENCOUNTER FOR SUPERVISION OF NORMAL FIRST PREGNANCY IN THIRD TRIMESTER: Primary | ICD-10-CM

## 2022-06-09 PROCEDURE — 99207 PR PRENATAL VISIT: CPT | Performed by: OBSTETRICS & GYNECOLOGY

## 2022-06-09 NOTE — PROGRESS NOTES
Concerns: restless legs, she tried OTC iron and could not tolerate the side effects  Doing well.  No concerns today.  No vaginal bleeding, LOF.  No contractions.  Cervix check next visit.  Cephalic position confirmed by Leopold maneuvers.  Reportable signs and symptoms discussed.  Discussed kick counts and fetal movement.  Discussed PTL, PROM, and when to call or come in.  RTC 2 weeks.    Eran Lopez MD

## 2022-06-09 NOTE — LETTER
June 9, 2022      Gibson Prakash  [id number] [facility] [address]        To Whom It May Concern:    Gibson Prakash was seen on 6/9/22.         Sincerely,        Eran Lopez MD

## 2022-06-09 NOTE — NURSING NOTE
"Initial /59 (BP Location: Right arm, Patient Position: Chair, Cuff Size: Adult Regular)   Pulse 82   Temp 97.5  F (36.4  C) (Tympanic)   Resp 16   Ht 1.727 m (5' 8\")   Wt 110.1 kg (242 lb 12.8 oz)   LMP 10/31/2021   Breastfeeding No   BMI 36.92 kg/m   Estimated body mass index is 36.92 kg/m  as calculated from the following:    Height as of this encounter: 1.727 m (5' 8\").    Weight as of this encounter: 110.1 kg (242 lb 12.8 oz). .    Mónica Morelos, ROSSANA    "

## 2022-06-16 ENCOUNTER — PRENATAL OFFICE VISIT (OUTPATIENT)
Dept: OBGYN | Facility: CLINIC | Age: 22
End: 2022-06-16
Payer: COMMERCIAL

## 2022-06-16 VITALS
HEART RATE: 91 BPM | WEIGHT: 249.3 LBS | TEMPERATURE: 97.9 F | HEIGHT: 68 IN | SYSTOLIC BLOOD PRESSURE: 121 MMHG | DIASTOLIC BLOOD PRESSURE: 69 MMHG | RESPIRATION RATE: 16 BRPM | BODY MASS INDEX: 37.78 KG/M2

## 2022-06-16 DIAGNOSIS — Z34.93 PRENATAL CARE IN THIRD TRIMESTER: Primary | ICD-10-CM

## 2022-06-16 PROCEDURE — 99207 PR PRENATAL VISIT: CPT | Performed by: OBSTETRICS & GYNECOLOGY

## 2022-06-16 PROCEDURE — 87653 STREP B DNA AMP PROBE: CPT | Performed by: OBSTETRICS & GYNECOLOGY

## 2022-06-16 NOTE — NURSING NOTE
"Initial /69 (BP Location: Right arm, Patient Position: Sitting, Cuff Size: Adult Large)   Pulse 91   Temp 97.9  F (36.6  C) (Tympanic)   Resp 16   Ht 1.727 m (5' 8\")   Wt 113.1 kg (249 lb 4.8 oz)   LMP 10/31/2021   BMI 37.91 kg/m   Estimated body mass index is 37.91 kg/m  as calculated from the following:    Height as of this encounter: 1.727 m (5' 8\").    Weight as of this encounter: 113.1 kg (249 lb 4.8 oz). .      "

## 2022-06-16 NOTE — PROGRESS NOTES
"CC: Here for routine prenatal visit @ 35w6d   HPI:  GBS done; reviewed s/s of labor, when to call nurse advice line, provider call coverage for delivery.    PE: /69 (BP Location: Right arm, Patient Position: Sitting, Cuff Size: Adult Large)   Pulse 91   Temp 97.9  F (36.6  C) (Tympanic)   Resp 16   Ht 1.727 m (5' 8\")   Wt 113.1 kg (249 lb 4.8 oz)   LMP 10/31/2021   BMI 37.91 kg/m     See OB flowsheet      A:  1. Prenatal care in third trimester    - Group B strep PCR      Routine prenatal care  RTC 1 weeks.      Linda Garrett M.D.     "

## 2022-06-17 LAB — GP B STREP DNA SPEC QL NAA+PROBE: NEGATIVE

## 2022-06-23 ENCOUNTER — PRENATAL OFFICE VISIT (OUTPATIENT)
Dept: OBGYN | Facility: CLINIC | Age: 22
End: 2022-06-23
Payer: COMMERCIAL

## 2022-06-23 VITALS
RESPIRATION RATE: 18 BRPM | TEMPERATURE: 97.8 F | WEIGHT: 255.7 LBS | DIASTOLIC BLOOD PRESSURE: 69 MMHG | SYSTOLIC BLOOD PRESSURE: 116 MMHG | BODY MASS INDEX: 38.75 KG/M2 | HEIGHT: 68 IN | HEART RATE: 97 BPM

## 2022-06-23 DIAGNOSIS — Z34.03 ENCOUNTER FOR SUPERVISION OF NORMAL FIRST PREGNANCY IN THIRD TRIMESTER: Primary | ICD-10-CM

## 2022-06-23 PROCEDURE — 99207 PR PRENATAL VISIT: CPT | Performed by: OBSTETRICS & GYNECOLOGY

## 2022-06-23 NOTE — PROGRESS NOTES
Concerns:   .  Doing well.  No concerns today.  No vaginal bleeding, LOF, contractions.  No HA, RUQ pain, N/V, visual changes.  Cervix is as above.  Cephalic position confirmed by Leopold maneuvers and cervical exam.  Discussed signs of labor and when to call or come in.  Reportable signs and symptoms discussed.  GBS done today.  Labor precautions discussed.  RTC 1 week.  Prenatal flowsheet information is reviewed.    Eran Lopez MD

## 2022-06-24 ENCOUNTER — HOSPITAL ENCOUNTER (OUTPATIENT)
Facility: CLINIC | Age: 22
Discharge: HOME | End: 2022-06-24
Attending: OBSTETRICS & GYNECOLOGY | Admitting: OBSTETRICS & GYNECOLOGY
Payer: COMMERCIAL

## 2022-06-24 ENCOUNTER — HOSPITAL ENCOUNTER (EMERGENCY)
Facility: CLINIC | Age: 22
End: 2022-06-24
Payer: COMMERCIAL

## 2022-06-24 ENCOUNTER — HOSPITAL ENCOUNTER (OUTPATIENT)
Facility: CLINIC | Age: 22
End: 2022-06-24
Admitting: OBSTETRICS & GYNECOLOGY
Payer: COMMERCIAL

## 2022-06-24 VITALS — RESPIRATION RATE: 16 BRPM | DIASTOLIC BLOOD PRESSURE: 60 MMHG | SYSTOLIC BLOOD PRESSURE: 122 MMHG | TEMPERATURE: 97.7 F

## 2022-06-24 PROBLEM — Z36.89 ENCOUNTER FOR TRIAGE IN PREGNANT PATIENT: Status: ACTIVE | Noted: 2022-06-24

## 2022-06-24 LAB
ALBUMIN UR-MCNC: NEGATIVE MG/DL
AMPHETAMINES UR QL SCN: NORMAL
APPEARANCE UR: ABNORMAL
BARBITURATES UR QL: NORMAL
BENZODIAZ UR QL: NORMAL
BILIRUB UR QL STRIP: NEGATIVE
CANNABINOIDS UR QL SCN: NORMAL
COCAINE UR QL: NORMAL
COLOR UR AUTO: YELLOW
GLUCOSE UR STRIP-MCNC: NEGATIVE MG/DL
HGB UR QL STRIP: NEGATIVE
KETONES UR STRIP-MCNC: NEGATIVE MG/DL
LEUKOCYTE ESTERASE UR QL STRIP: ABNORMAL
MUCOUS THREADS #/AREA URNS LPF: PRESENT /LPF
NITRATE UR QL: NEGATIVE
OPIATES UR QL SCN: NORMAL
PCP UR QL SCN: NORMAL
PH UR STRIP: 6 [PH] (ref 5–7)
RBC URINE: 1 /HPF
SP GR UR STRIP: 1.02 (ref 1–1.03)
SQUAMOUS EPITHELIAL: 18 /HPF
UROBILINOGEN UR STRIP-MCNC: NORMAL MG/DL
WBC URINE: 4 /HPF

## 2022-06-24 PROCEDURE — 59025 FETAL NON-STRESS TEST: CPT

## 2022-06-24 PROCEDURE — 81003 URINALYSIS AUTO W/O SCOPE: CPT | Performed by: OBSTETRICS & GYNECOLOGY

## 2022-06-24 PROCEDURE — 59025 FETAL NON-STRESS TEST: CPT | Mod: 26 | Performed by: OBSTETRICS & GYNECOLOGY

## 2022-06-24 PROCEDURE — G0463 HOSPITAL OUTPT CLINIC VISIT: HCPCS

## 2022-06-24 PROCEDURE — 999N000105 HC STATISTIC NO DOCUMENTATION TO SUPPORT CHARGE

## 2022-06-24 PROCEDURE — 80307 DRUG TEST PRSMV CHEM ANLYZR: CPT | Performed by: OBSTETRICS & GYNECOLOGY

## 2022-06-24 RX ORDER — LIDOCAINE 40 MG/G
CREAM TOPICAL
Status: DISCONTINUED | OUTPATIENT
Start: 2022-06-24 | End: 2022-06-24 | Stop reason: HOSPADM

## 2022-06-24 RX ORDER — ACETAMINOPHEN 325 MG/1
650 TABLET ORAL EVERY 4 HOURS PRN
Status: DISCONTINUED | OUTPATIENT
Start: 2022-06-24 | End: 2022-06-24 | Stop reason: HOSPADM

## 2022-06-24 NOTE — PROGRESS NOTES
"S:Patient presents due to backache.  ED reported low back pain.  Pt reported upper back and neck.  \"I've tried everything\".  Denies taking tylenol because she did not have any.  B:37w0d   Allergies: Patient has no known allergies.  A:A:fetal heart rate tracing baseline 145:\"moderate variablility\",\"+ accels\",\"no decels\",\"Category I\"  Occ contraction.  Was seen yesterday by Dr Lopez.  SVE 3%.  Dr. PAOLA Buchanan in department and aware of patient.  Heat applied to her neck area.   Plan includes; Encourage po fluids, send UA.  Monitor, observe and reevaluate and Labs . Reviewed with patient and she agrees with plan.                   "

## 2022-06-24 NOTE — PROGRESS NOTES
"Pain improved with heat. Given tylenol by family member in the room. UA negative. Patient reports feeling a \"knot\" near her shoulder blade. Reviewed comfort measures including tylenol, stretching, massage, chiropractor care. Dr. Buchanan updated; plan to discharge home. Discharge paperwork reviewed with patient; verbalized understanding. Off unit with family member at 1300   "

## 2022-06-30 ENCOUNTER — PRENATAL OFFICE VISIT (OUTPATIENT)
Dept: OBGYN | Facility: CLINIC | Age: 22
End: 2022-06-30
Payer: COMMERCIAL

## 2022-06-30 VITALS
TEMPERATURE: 97.8 F | BODY MASS INDEX: 38.78 KG/M2 | HEIGHT: 68 IN | RESPIRATION RATE: 14 BRPM | SYSTOLIC BLOOD PRESSURE: 112 MMHG | HEART RATE: 80 BPM | WEIGHT: 255.9 LBS | DIASTOLIC BLOOD PRESSURE: 68 MMHG

## 2022-06-30 DIAGNOSIS — Z34.93 PRENATAL CARE, THIRD TRIMESTER: Primary | ICD-10-CM

## 2022-06-30 PROCEDURE — 99207 PR PRENATAL VISIT: CPT | Performed by: OBSTETRICS & GYNECOLOGY

## 2022-06-30 RX ORDER — SODIUM CHLORIDE, SODIUM LACTATE, POTASSIUM CHLORIDE, CALCIUM CHLORIDE 600; 310; 30; 20 MG/100ML; MG/100ML; MG/100ML; MG/100ML
INJECTION, SOLUTION INTRAVENOUS CONTINUOUS PRN
Status: CANCELLED | OUTPATIENT
Start: 2022-06-30

## 2022-06-30 RX ORDER — MISOPROSTOL 200 UG/1
400 TABLET ORAL
Status: CANCELLED | OUTPATIENT
Start: 2022-06-30

## 2022-06-30 RX ORDER — NALOXONE HYDROCHLORIDE 0.4 MG/ML
0.4 INJECTION, SOLUTION INTRAMUSCULAR; INTRAVENOUS; SUBCUTANEOUS
Status: CANCELLED | OUTPATIENT
Start: 2022-06-30

## 2022-06-30 RX ORDER — TRANEXAMIC ACID 10 MG/ML
1 INJECTION, SOLUTION INTRAVENOUS EVERY 30 MIN PRN
Status: CANCELLED | OUTPATIENT
Start: 2022-06-30

## 2022-06-30 RX ORDER — KETOROLAC TROMETHAMINE 30 MG/ML
30 INJECTION, SOLUTION INTRAMUSCULAR; INTRAVENOUS
Status: CANCELLED | OUTPATIENT
Start: 2022-06-30 | End: 2022-07-05

## 2022-06-30 RX ORDER — CARBOPROST TROMETHAMINE 250 UG/ML
250 INJECTION, SOLUTION INTRAMUSCULAR
Status: CANCELLED | OUTPATIENT
Start: 2022-06-30

## 2022-06-30 RX ORDER — PROCHLORPERAZINE 25 MG
25 SUPPOSITORY, RECTAL RECTAL EVERY 12 HOURS PRN
Status: CANCELLED | OUTPATIENT
Start: 2022-06-30

## 2022-06-30 RX ORDER — LIDOCAINE 40 MG/G
CREAM TOPICAL
Status: CANCELLED | OUTPATIENT
Start: 2022-06-30

## 2022-06-30 RX ORDER — IBUPROFEN 200 MG
800 TABLET ORAL
Status: CANCELLED | OUTPATIENT
Start: 2022-06-30 | End: 2022-07-05

## 2022-06-30 RX ORDER — CITRIC ACID/SODIUM CITRATE 334-500MG
30 SOLUTION, ORAL ORAL
Status: CANCELLED | OUTPATIENT
Start: 2022-06-30

## 2022-06-30 RX ORDER — CITRIC ACID/SODIUM CITRATE 334-500MG
30 SOLUTION, ORAL ORAL ONCE
Status: CANCELLED | OUTPATIENT
Start: 2022-06-30 | End: 2022-06-30

## 2022-06-30 RX ORDER — MISOPROSTOL 200 UG/1
800 TABLET ORAL
Status: CANCELLED | OUTPATIENT
Start: 2022-06-30

## 2022-06-30 RX ORDER — ONDANSETRON 2 MG/ML
4 INJECTION INTRAMUSCULAR; INTRAVENOUS EVERY 6 HOURS PRN
Status: CANCELLED | OUTPATIENT
Start: 2022-06-30

## 2022-06-30 RX ORDER — METHYLERGONOVINE MALEATE 0.2 MG/ML
200 INJECTION INTRAVENOUS
Status: CANCELLED | OUTPATIENT
Start: 2022-06-30

## 2022-06-30 RX ORDER — NALOXONE HYDROCHLORIDE 0.4 MG/ML
0.2 INJECTION, SOLUTION INTRAMUSCULAR; INTRAVENOUS; SUBCUTANEOUS
Status: CANCELLED | OUTPATIENT
Start: 2022-06-30

## 2022-06-30 RX ORDER — METOCLOPRAMIDE 10 MG/1
10 TABLET ORAL EVERY 6 HOURS PRN
Status: CANCELLED | OUTPATIENT
Start: 2022-06-30

## 2022-06-30 RX ORDER — OXYTOCIN/0.9 % SODIUM CHLORIDE 30/500 ML
100-340 PLASTIC BAG, INJECTION (ML) INTRAVENOUS CONTINUOUS PRN
Status: CANCELLED | OUTPATIENT
Start: 2022-06-30

## 2022-06-30 RX ORDER — OXYTOCIN/0.9 % SODIUM CHLORIDE 30/500 ML
1-24 PLASTIC BAG, INJECTION (ML) INTRAVENOUS CONTINUOUS
Status: CANCELLED | OUTPATIENT
Start: 2022-06-30

## 2022-06-30 RX ORDER — TERBUTALINE SULFATE 1 MG/ML
0.25 INJECTION, SOLUTION SUBCUTANEOUS
Status: CANCELLED | OUTPATIENT
Start: 2022-06-30

## 2022-06-30 RX ORDER — OXYTOCIN 10 [USP'U]/ML
10 INJECTION, SOLUTION INTRAMUSCULAR; INTRAVENOUS
Status: CANCELLED | OUTPATIENT
Start: 2022-06-30

## 2022-06-30 RX ORDER — METOCLOPRAMIDE HYDROCHLORIDE 5 MG/ML
10 INJECTION INTRAMUSCULAR; INTRAVENOUS EVERY 6 HOURS PRN
Status: CANCELLED | OUTPATIENT
Start: 2022-06-30

## 2022-06-30 RX ORDER — SODIUM CHLORIDE, SODIUM LACTATE, POTASSIUM CHLORIDE, CALCIUM CHLORIDE 600; 310; 30; 20 MG/100ML; MG/100ML; MG/100ML; MG/100ML
INJECTION, SOLUTION INTRAVENOUS CONTINUOUS
Status: CANCELLED | OUTPATIENT
Start: 2022-06-30

## 2022-06-30 RX ORDER — OXYTOCIN/0.9 % SODIUM CHLORIDE 30/500 ML
340 PLASTIC BAG, INJECTION (ML) INTRAVENOUS CONTINUOUS PRN
Status: CANCELLED | OUTPATIENT
Start: 2022-06-30

## 2022-06-30 RX ORDER — ONDANSETRON 4 MG/1
4 TABLET, ORALLY DISINTEGRATING ORAL EVERY 6 HOURS PRN
Status: CANCELLED | OUTPATIENT
Start: 2022-06-30

## 2022-06-30 RX ORDER — PROCHLORPERAZINE MALEATE 10 MG
10 TABLET ORAL EVERY 6 HOURS PRN
Status: CANCELLED | OUTPATIENT
Start: 2022-06-30

## 2022-06-30 NOTE — NURSING NOTE
"Initial /68 (BP Location: Right arm, Patient Position: Sitting, Cuff Size: Adult Large)   Pulse 80   Temp 97.8  F (36.6  C) (Tympanic)   Resp 14   Ht 1.727 m (5' 8\")   Wt 116.1 kg (255 lb 14.4 oz)   LMP 10/31/2021   BMI 38.91 kg/m   Estimated body mass index is 38.91 kg/m  as calculated from the following:    Height as of this encounter: 1.727 m (5' 8\").    Weight as of this encounter: 116.1 kg (255 lb 14.4 oz). .      "

## 2022-06-30 NOTE — PROGRESS NOTES
My chart message sent regarding patient's instructions and date for induction. She had left before I was able to give her her instruction sheet.

## 2022-06-30 NOTE — PROGRESS NOTES
"[facility]   OB/GYN Clinic     CC: Return OB      Subjective:     Gibson is a 21 year old  at 37w6d who presents for return OB visit. She reports feeling well. Denies uterine cramping, vaginal bleeding or leaking, dysuria. +fetal movement.   ]  Objective:/68 (BP Location: Right arm, Patient Position: Sitting, Cuff Size: Adult Large)   Pulse 80   Temp 97.8  F (36.6  C) (Tympanic)   Resp 14   Ht 1.727 m (5' 8\")   Wt 116.1 kg (255 lb 14.4 oz)   LMP 10/31/2021   BMI 38.91 kg/m     Physical Exam:  Gen: Pleasant, talkative female in no apparent distress   Respiratory: breathing comfortably on room air   Cardiac: Warm and well-perfused.   GI: Abd soft and non-tender, gravid  MSK: Grossly normal movement of all four extremities  Psych: mood and affect bright   Lower extremity: edema not present      Fetal dop tones: 140s bpm  Fundal height: 37  SVE  3/80/-2     Assessment/Plan:   21 year old  at 37w6d  who presents for follow-up OB visit.   1) third tri labs today  2) anatomy scan WNL  3) quad normal   4) hx of meth abuse, tobacco use (e cigs)- in substance abuse program  5) Medication review: no changes, continue prenatal vitamin   6) Immunizations: flu shot in the fall, Tdap next visit     Return to clinic in 1 week    IOL scheduled for      Eleni Buchanan MD   "

## 2022-07-08 ENCOUNTER — TELEPHONE (OUTPATIENT)
Dept: OBGYN | Facility: CLINIC | Age: 22
End: 2022-07-08

## 2022-07-08 ENCOUNTER — PRENATAL OFFICE VISIT (OUTPATIENT)
Dept: OBGYN | Facility: CLINIC | Age: 22
End: 2022-07-08
Payer: COMMERCIAL

## 2022-07-08 VITALS
WEIGHT: 260.7 LBS | RESPIRATION RATE: 16 BRPM | HEART RATE: 84 BPM | SYSTOLIC BLOOD PRESSURE: 112 MMHG | BODY MASS INDEX: 39.51 KG/M2 | TEMPERATURE: 98.7 F | DIASTOLIC BLOOD PRESSURE: 68 MMHG | HEIGHT: 68 IN

## 2022-07-08 DIAGNOSIS — Z34.93 PRENATAL CARE, THIRD TRIMESTER: Primary | ICD-10-CM

## 2022-07-08 DIAGNOSIS — Z01.812 PRE-PROCEDURE LAB EXAM: ICD-10-CM

## 2022-07-08 LAB — SARS-COV-2 RNA RESP QL NAA+PROBE: NEGATIVE

## 2022-07-08 PROCEDURE — 99207 PR PRENATAL VISIT: CPT | Performed by: OBSTETRICS & GYNECOLOGY

## 2022-07-08 PROCEDURE — U0003 INFECTIOUS AGENT DETECTION BY NUCLEIC ACID (DNA OR RNA); SEVERE ACUTE RESPIRATORY SYNDROME CORONAVIRUS 2 (SARS-COV-2) (CORONAVIRUS DISEASE [COVID-19]), AMPLIFIED PROBE TECHNIQUE, MAKING USE OF HIGH THROUGHPUT TECHNOLOGIES AS DESCRIBED BY CMS-2020-01-R: HCPCS | Performed by: OBSTETRICS & GYNECOLOGY

## 2022-07-08 PROCEDURE — U0005 INFEC AGEN DETEC AMPLI PROBE: HCPCS | Performed by: OBSTETRICS & GYNECOLOGY

## 2022-07-08 ASSESSMENT — PATIENT HEALTH QUESTIONNAIRE - PHQ9: SUM OF ALL RESPONSES TO PHQ QUESTIONS 1-9: 3

## 2022-07-08 NOTE — TELEPHONE ENCOUNTER
Pt was called and date/time of her induction was verified as well as her pre-procedure instructions were reviewed. Pt will call the birthplace at 0600 to make sure date/time still work.  All pt's questions were answered.

## 2022-07-08 NOTE — NURSING NOTE
"Initial /68 (BP Location: Right arm, Patient Position: Chair, Cuff Size: Adult Large)   Pulse 84   Temp 98.7  F (37.1  C) (Tympanic)   Resp 16   Ht 1.727 m (5' 8\")   Wt 118.3 kg (260 lb 11.2 oz)   LMP 10/31/2021   BMI 39.64 kg/m   Estimated body mass index is 39.64 kg/m  as calculated from the following:    Height as of this encounter: 1.727 m (5' 8\").    Weight as of this encounter: 118.3 kg (260 lb 11.2 oz). .      "

## 2022-07-08 NOTE — PROGRESS NOTES
"[facility]   OB/GYN Clinic     CC: Return OB      Subjective:     Gibson is a 21 year old  at 39w0d who presents for return OB visit. She reports feeling well. Denies uterine cramping, vaginal bleeding or leaking, dysuria. +fetal movement.     /68 (BP Location: Right arm, Patient Position: Chair, Cuff Size: Adult Large)   Pulse 84   Temp 98.7  F (37.1  C) (Tympanic)   Resp 16   Ht 1.727 m (5' 8\")   Wt 118.3 kg (260 lb 11.2 oz)   LMP 10/31/2021   BMI 39.64 kg/m       Physical Exam:  Gen: Pleasant, talkative female in no apparent distress   Respiratory: breathing comfortably on room air   Cardiac: Warm and well-perfused.   GI: Abd soft and non-tender, gravid  MSK: Grossly normal movement of all four extremities  Psych: mood and affect bright   Lower extremity: edema not present      Fetal dop tones: 140s  bpm  Fundal height: 36 suspect 2/2 drop in pelvis  SVE  3/80/-2     Assessment/Plan:   21 year old  at 39w0d who presents for follow-up OB visit.   1) third tri labs today  2) anatomy scan WNL  3) quad normal   4) hx of meth abuse, tobacco use (e cigs)- in substance abuse program  5) Medication review: no changes, continue prenatal vitamin   6) Immunizations: flu shot in the fall, Tdap next visit       IOL scheduled for tomorrow, covid swab obtained    Eleni Buchanan MD   2022 11:44 AM   "

## 2022-07-09 ENCOUNTER — NURSE TRIAGE (OUTPATIENT)
Dept: NURSING | Facility: CLINIC | Age: 22
End: 2022-07-09

## 2022-07-09 NOTE — TELEPHONE ENCOUNTER
Pt inquiring if she is supposed to go in for scheduled induction today.  I told pt I would call [facility] and call her back with the information.      I called L&D and was told that pt will not be induced today.    Charge nurse said pt called them on another line as we spoke.    Per charge Nurse, pt was informed Not to come in today.    Andria Villegas RN    Reason for Disposition    [1] Caller requesting NON-URGENT health information AND [2] PCP's office is the best resource    Additional Information    Negative: Requesting regular office appointment    Negative: RN needs further essential information from caller in order to complete triage    Negative: [1] Caller is not with the adult (patient) AND [2] reporting urgent symptoms    Negative: Lab result questions    Negative: Medication questions    Negative: Caller can't be reached by phone    Negative: Caller has already spoken to PCP or another triager    Protocols used: INFORMATION ONLY CALL - NO TRIAGE-A-

## 2022-07-10 ENCOUNTER — ANESTHESIA EVENT (OUTPATIENT)
Dept: OBGYN | Facility: CLINIC | Age: 22
End: 2022-07-10
Payer: COMMERCIAL

## 2022-07-10 ENCOUNTER — HOSPITAL ENCOUNTER (INPATIENT)
Facility: CLINIC | Age: 22
LOS: 2 days | Discharge: HOME | End: 2022-07-12
Attending: OBSTETRICS & GYNECOLOGY | Admitting: OBSTETRICS & GYNECOLOGY
Payer: COMMERCIAL

## 2022-07-10 ENCOUNTER — ANESTHESIA (OUTPATIENT)
Dept: OBGYN | Facility: CLINIC | Age: 22
End: 2022-07-10
Payer: COMMERCIAL

## 2022-07-10 PROBLEM — Z34.00 PRENATAL CARE, FIRST PREGNANCY: Status: RESOLVED | Noted: 2022-02-16 | Resolved: 2022-07-10

## 2022-07-10 PROBLEM — Z34.93 PRENATAL CARE, THIRD TRIMESTER: Status: RESOLVED | Noted: 2022-07-10 | Resolved: 2022-07-10

## 2022-07-10 PROBLEM — Z34.93 PRENATAL CARE, THIRD TRIMESTER: Status: ACTIVE | Noted: 2022-07-10

## 2022-07-10 PROBLEM — Z36.89 ENCOUNTER FOR TRIAGE IN PREGNANT PATIENT: Status: RESOLVED | Noted: 2022-06-24 | Resolved: 2022-07-10

## 2022-07-10 LAB
ABO/RH(D): NORMAL
AMPHETAMINES UR QL SCN: NORMAL
ANTIBODY SCREEN: NEGATIVE
BARBITURATES UR QL: NORMAL
BASOPHILS # BLD AUTO: 0.1 10E3/UL (ref 0–0.2)
BASOPHILS NFR BLD AUTO: 0 %
BENZODIAZ UR QL: NORMAL
CANNABINOIDS UR QL SCN: NORMAL
COCAINE UR QL: NORMAL
EOSINOPHIL # BLD AUTO: 0.1 10E3/UL (ref 0–0.7)
EOSINOPHIL NFR BLD AUTO: 1 %
ERYTHROCYTE [DISTWIDTH] IN BLOOD BY AUTOMATED COUNT: 12.8 % (ref 10–15)
HCT VFR BLD AUTO: 35.4 % (ref 35–47)
HGB BLD-MCNC: 11.8 G/DL (ref 11.7–15.7)
IMM GRANULOCYTES # BLD: 0.4 10E3/UL
IMM GRANULOCYTES NFR BLD: 2 %
LYMPHOCYTES # BLD AUTO: 2.3 10E3/UL (ref 0.8–5.3)
LYMPHOCYTES NFR BLD AUTO: 15 %
MCH RBC QN AUTO: 28.6 PG (ref 26.5–33)
MCHC RBC AUTO-ENTMCNC: 33.3 G/DL (ref 31.5–36.5)
MCV RBC AUTO: 86 FL (ref 78–100)
MONOCYTES # BLD AUTO: 1.3 10E3/UL (ref 0–1.3)
MONOCYTES NFR BLD AUTO: 8 %
NEUTROPHILS # BLD AUTO: 11.4 10E3/UL (ref 1.6–8.3)
NEUTROPHILS NFR BLD AUTO: 74 %
NRBC # BLD AUTO: 0 10E3/UL
NRBC BLD AUTO-RTO: 0 /100
OPIATES UR QL SCN: NORMAL
PCP UR QL SCN: NORMAL
PLATELET # BLD AUTO: 310 10E3/UL (ref 150–450)
RBC # BLD AUTO: 4.12 10E6/UL (ref 3.8–5.2)
SPECIMEN EXPIRATION DATE: NORMAL
WBC # BLD AUTO: 15.5 10E3/UL (ref 4–11)

## 2022-07-10 PROCEDURE — 86901 BLOOD TYPING SEROLOGIC RH(D): CPT | Performed by: OBSTETRICS & GYNECOLOGY

## 2022-07-10 PROCEDURE — 120N000001 HC R&B MED SURG/OB

## 2022-07-10 PROCEDURE — 3E0R3BZ INTRODUCTION OF ANESTHETIC AGENT INTO SPINAL CANAL, PERCUTANEOUS APPROACH: ICD-10-PCS | Performed by: OBSTETRICS & GYNECOLOGY

## 2022-07-10 PROCEDURE — 722N000001 HC LABOR CARE VAGINAL DELIVERY SINGLE

## 2022-07-10 PROCEDURE — 250N000011 HC RX IP 250 OP 636: Performed by: OBSTETRICS & GYNECOLOGY

## 2022-07-10 PROCEDURE — 250N000011 HC RX IP 250 OP 636: Performed by: NURSE ANESTHETIST, CERTIFIED REGISTERED

## 2022-07-10 PROCEDURE — 00HU33Z INSERTION OF INFUSION DEVICE INTO SPINAL CANAL, PERCUTANEOUS APPROACH: ICD-10-PCS | Performed by: OBSTETRICS & GYNECOLOGY

## 2022-07-10 PROCEDURE — 250N000011 HC RX IP 250 OP 636

## 2022-07-10 PROCEDURE — 59400 OBSTETRICAL CARE: CPT | Performed by: OBSTETRICS & GYNECOLOGY

## 2022-07-10 PROCEDURE — 3E033VJ INTRODUCTION OF OTHER HORMONE INTO PERIPHERAL VEIN, PERCUTANEOUS APPROACH: ICD-10-PCS | Performed by: OBSTETRICS & GYNECOLOGY

## 2022-07-10 PROCEDURE — 250N000009 HC RX 250: Performed by: NURSE ANESTHETIST, CERTIFIED REGISTERED

## 2022-07-10 PROCEDURE — 370N000003 HC ANESTHESIA WARD SERVICE

## 2022-07-10 PROCEDURE — 10907ZC DRAINAGE OF AMNIOTIC FLUID, THERAPEUTIC FROM PRODUCTS OF CONCEPTION, VIA NATURAL OR ARTIFICIAL OPENING: ICD-10-PCS | Performed by: OBSTETRICS & GYNECOLOGY

## 2022-07-10 PROCEDURE — 86780 TREPONEMA PALLIDUM: CPT | Performed by: OBSTETRICS & GYNECOLOGY

## 2022-07-10 PROCEDURE — 258N000003 HC RX IP 258 OP 636: Performed by: OBSTETRICS & GYNECOLOGY

## 2022-07-10 PROCEDURE — 250N000009 HC RX 250: Performed by: OBSTETRICS & GYNECOLOGY

## 2022-07-10 PROCEDURE — 80307 DRUG TEST PRSMV CHEM ANLYZR: CPT | Performed by: OBSTETRICS & GYNECOLOGY

## 2022-07-10 PROCEDURE — 250N000013 HC RX MED GY IP 250 OP 250 PS 637: Performed by: OBSTETRICS & GYNECOLOGY

## 2022-07-10 PROCEDURE — 85025 COMPLETE CBC W/AUTO DIFF WBC: CPT | Performed by: OBSTETRICS & GYNECOLOGY

## 2022-07-10 RX ORDER — CARBOPROST TROMETHAMINE 250 UG/ML
250 INJECTION, SOLUTION INTRAMUSCULAR
Status: DISCONTINUED | OUTPATIENT
Start: 2022-07-10 | End: 2022-07-12 | Stop reason: HOSPADM

## 2022-07-10 RX ORDER — OXYTOCIN 10 [USP'U]/ML
10 INJECTION, SOLUTION INTRAMUSCULAR; INTRAVENOUS
Status: DISCONTINUED | OUTPATIENT
Start: 2022-07-10 | End: 2022-07-10

## 2022-07-10 RX ORDER — HYDROCORTISONE 25 MG/G
CREAM TOPICAL 3 TIMES DAILY PRN
Status: DISCONTINUED | OUTPATIENT
Start: 2022-07-10 | End: 2022-07-12 | Stop reason: HOSPADM

## 2022-07-10 RX ORDER — EPHEDRINE SULFATE 50 MG/ML
5 INJECTION, SOLUTION INTRAMUSCULAR; INTRAVENOUS; SUBCUTANEOUS
Status: DISCONTINUED | OUTPATIENT
Start: 2022-07-10 | End: 2022-07-10

## 2022-07-10 RX ORDER — IBUPROFEN 800 MG/1
800 TABLET, FILM COATED ORAL
Status: DISCONTINUED | OUTPATIENT
Start: 2022-07-10 | End: 2022-07-10

## 2022-07-10 RX ORDER — MISOPROSTOL 200 UG/1
800 TABLET ORAL
Status: DISCONTINUED | OUTPATIENT
Start: 2022-07-10 | End: 2022-07-12 | Stop reason: HOSPADM

## 2022-07-10 RX ORDER — TERBUTALINE SULFATE 1 MG/ML
0.25 INJECTION, SOLUTION SUBCUTANEOUS
Status: DISCONTINUED | OUTPATIENT
Start: 2022-07-10 | End: 2022-07-10

## 2022-07-10 RX ORDER — LIDOCAINE HYDROCHLORIDE AND EPINEPHRINE 15; 5 MG/ML; UG/ML
INJECTION, SOLUTION EPIDURAL PRN
Status: DISCONTINUED | OUTPATIENT
Start: 2022-07-10 | End: 2022-07-10

## 2022-07-10 RX ORDER — ENOXAPARIN SODIUM 100 MG/ML
40 INJECTION SUBCUTANEOUS EVERY 24 HOURS
Status: DISCONTINUED | OUTPATIENT
Start: 2022-07-11 | End: 2022-07-12 | Stop reason: HOSPADM

## 2022-07-10 RX ORDER — METOCLOPRAMIDE HYDROCHLORIDE 5 MG/ML
10 INJECTION INTRAMUSCULAR; INTRAVENOUS EVERY 6 HOURS PRN
Status: DISCONTINUED | OUTPATIENT
Start: 2022-07-10 | End: 2022-07-10

## 2022-07-10 RX ORDER — LIDOCAINE 40 MG/G
CREAM TOPICAL
Status: DISCONTINUED | OUTPATIENT
Start: 2022-07-10 | End: 2022-07-10

## 2022-07-10 RX ORDER — SODIUM CHLORIDE, SODIUM LACTATE, POTASSIUM CHLORIDE, CALCIUM CHLORIDE 600; 310; 30; 20 MG/100ML; MG/100ML; MG/100ML; MG/100ML
INJECTION, SOLUTION INTRAVENOUS CONTINUOUS
Status: DISCONTINUED | OUTPATIENT
Start: 2022-07-10 | End: 2022-07-10

## 2022-07-10 RX ORDER — ACETAMINOPHEN 325 MG/1
650 TABLET ORAL EVERY 4 HOURS PRN
Status: DISCONTINUED | OUTPATIENT
Start: 2022-07-10 | End: 2022-07-12 | Stop reason: HOSPADM

## 2022-07-10 RX ORDER — LIDOCAINE HYDROCHLORIDE 10 MG/ML
INJECTION, SOLUTION INFILTRATION; PERINEURAL PRN
Status: DISCONTINUED | OUTPATIENT
Start: 2022-07-10 | End: 2022-07-10

## 2022-07-10 RX ORDER — CITRIC ACID/SODIUM CITRATE 334-500MG
30 SOLUTION, ORAL ORAL ONCE
Status: DISCONTINUED | OUTPATIENT
Start: 2022-07-10 | End: 2022-07-10

## 2022-07-10 RX ORDER — METOCLOPRAMIDE 10 MG/1
10 TABLET ORAL EVERY 6 HOURS PRN
Status: DISCONTINUED | OUTPATIENT
Start: 2022-07-10 | End: 2022-07-10

## 2022-07-10 RX ORDER — NALOXONE HYDROCHLORIDE 0.4 MG/ML
0.2 INJECTION, SOLUTION INTRAMUSCULAR; INTRAVENOUS; SUBCUTANEOUS
Status: DISCONTINUED | OUTPATIENT
Start: 2022-07-10 | End: 2022-07-10

## 2022-07-10 RX ORDER — OXYTOCIN/0.9 % SODIUM CHLORIDE 30/500 ML
340 PLASTIC BAG, INJECTION (ML) INTRAVENOUS CONTINUOUS PRN
Status: DISCONTINUED | OUTPATIENT
Start: 2022-07-10 | End: 2022-07-12 | Stop reason: HOSPADM

## 2022-07-10 RX ORDER — TRANEXAMIC ACID 10 MG/ML
1 INJECTION, SOLUTION INTRAVENOUS EVERY 30 MIN PRN
Status: DISCONTINUED | OUTPATIENT
Start: 2022-07-10 | End: 2022-07-10

## 2022-07-10 RX ORDER — ONDANSETRON 2 MG/ML
INJECTION INTRAMUSCULAR; INTRAVENOUS
Status: COMPLETED
Start: 2022-07-10 | End: 2022-07-10

## 2022-07-10 RX ORDER — ONDANSETRON 2 MG/ML
4 INJECTION INTRAMUSCULAR; INTRAVENOUS EVERY 6 HOURS PRN
Status: DISCONTINUED | OUTPATIENT
Start: 2022-07-10 | End: 2022-07-10

## 2022-07-10 RX ORDER — FENTANYL CITRATE 50 UG/ML
INJECTION, SOLUTION INTRAMUSCULAR; INTRAVENOUS PRN
Status: DISCONTINUED | OUTPATIENT
Start: 2022-07-10 | End: 2022-07-10

## 2022-07-10 RX ORDER — SODIUM CHLORIDE, SODIUM LACTATE, POTASSIUM CHLORIDE, CALCIUM CHLORIDE 600; 310; 30; 20 MG/100ML; MG/100ML; MG/100ML; MG/100ML
INJECTION, SOLUTION INTRAVENOUS CONTINUOUS PRN
Status: DISCONTINUED | OUTPATIENT
Start: 2022-07-10 | End: 2022-07-10

## 2022-07-10 RX ORDER — METHYLERGONOVINE MALEATE 0.2 MG/ML
200 INJECTION INTRAVENOUS
Status: DISCONTINUED | OUTPATIENT
Start: 2022-07-10 | End: 2022-07-12 | Stop reason: HOSPADM

## 2022-07-10 RX ORDER — MISOPROSTOL 200 UG/1
400 TABLET ORAL
Status: DISCONTINUED | OUTPATIENT
Start: 2022-07-10 | End: 2022-07-12 | Stop reason: HOSPADM

## 2022-07-10 RX ORDER — PRENATAL VIT/IRON FUM/FOLIC AC 27MG-0.8MG
1 TABLET ORAL DAILY
Status: DISCONTINUED | OUTPATIENT
Start: 2022-07-11 | End: 2022-07-12 | Stop reason: HOSPADM

## 2022-07-10 RX ORDER — KETOROLAC TROMETHAMINE 30 MG/ML
30 INJECTION, SOLUTION INTRAMUSCULAR; INTRAVENOUS
Status: DISCONTINUED | OUTPATIENT
Start: 2022-07-10 | End: 2022-07-10

## 2022-07-10 RX ORDER — OXYTOCIN 10 [USP'U]/ML
10 INJECTION, SOLUTION INTRAMUSCULAR; INTRAVENOUS
Status: DISCONTINUED | OUTPATIENT
Start: 2022-07-10 | End: 2022-07-12 | Stop reason: HOSPADM

## 2022-07-10 RX ORDER — BISACODYL 10 MG
10 SUPPOSITORY, RECTAL RECTAL DAILY PRN
Status: DISCONTINUED | OUTPATIENT
Start: 2022-07-10 | End: 2022-07-12 | Stop reason: HOSPADM

## 2022-07-10 RX ORDER — NALBUPHINE HYDROCHLORIDE 10 MG/ML
2.5-5 INJECTION, SOLUTION INTRAMUSCULAR; INTRAVENOUS; SUBCUTANEOUS EVERY 6 HOURS PRN
Status: DISCONTINUED | OUTPATIENT
Start: 2022-07-10 | End: 2022-07-10

## 2022-07-10 RX ORDER — OXYTOCIN/0.9 % SODIUM CHLORIDE 30/500 ML
340 PLASTIC BAG, INJECTION (ML) INTRAVENOUS CONTINUOUS PRN
Status: COMPLETED | OUTPATIENT
Start: 2022-07-10 | End: 2022-07-10

## 2022-07-10 RX ORDER — IBUPROFEN 800 MG/1
800 TABLET, FILM COATED ORAL EVERY 6 HOURS PRN
Status: DISCONTINUED | OUTPATIENT
Start: 2022-07-10 | End: 2022-07-12 | Stop reason: HOSPADM

## 2022-07-10 RX ORDER — CITRIC ACID/SODIUM CITRATE 334-500MG
30 SOLUTION, ORAL ORAL
Status: DISCONTINUED | OUTPATIENT
Start: 2022-07-10 | End: 2022-07-10

## 2022-07-10 RX ORDER — PROCHLORPERAZINE 25 MG
25 SUPPOSITORY, RECTAL RECTAL EVERY 12 HOURS PRN
Status: DISCONTINUED | OUTPATIENT
Start: 2022-07-10 | End: 2022-07-10

## 2022-07-10 RX ORDER — ONDANSETRON 4 MG/1
4 TABLET, ORALLY DISINTEGRATING ORAL EVERY 6 HOURS PRN
Status: DISCONTINUED | OUTPATIENT
Start: 2022-07-10 | End: 2022-07-10

## 2022-07-10 RX ORDER — MODIFIED LANOLIN
OINTMENT (GRAM) TOPICAL
Status: DISCONTINUED | OUTPATIENT
Start: 2022-07-10 | End: 2022-07-12 | Stop reason: HOSPADM

## 2022-07-10 RX ORDER — DOCUSATE SODIUM 100 MG/1
100 CAPSULE, LIQUID FILLED ORAL DAILY
Status: DISCONTINUED | OUTPATIENT
Start: 2022-07-11 | End: 2022-07-12 | Stop reason: HOSPADM

## 2022-07-10 RX ORDER — OXYTOCIN/0.9 % SODIUM CHLORIDE 30/500 ML
1-24 PLASTIC BAG, INJECTION (ML) INTRAVENOUS CONTINUOUS
Status: DISCONTINUED | OUTPATIENT
Start: 2022-07-10 | End: 2022-07-10

## 2022-07-10 RX ORDER — MISOPROSTOL 200 UG/1
800 TABLET ORAL
Status: DISCONTINUED | OUTPATIENT
Start: 2022-07-10 | End: 2022-07-10

## 2022-07-10 RX ORDER — MISOPROSTOL 200 UG/1
400 TABLET ORAL
Status: DISCONTINUED | OUTPATIENT
Start: 2022-07-10 | End: 2022-07-10

## 2022-07-10 RX ORDER — OXYTOCIN/0.9 % SODIUM CHLORIDE 30/500 ML
100-340 PLASTIC BAG, INJECTION (ML) INTRAVENOUS CONTINUOUS PRN
Status: DISCONTINUED | OUTPATIENT
Start: 2022-07-10 | End: 2022-07-10

## 2022-07-10 RX ORDER — CARBOPROST TROMETHAMINE 250 UG/ML
250 INJECTION, SOLUTION INTRAMUSCULAR
Status: DISCONTINUED | OUTPATIENT
Start: 2022-07-10 | End: 2022-07-10

## 2022-07-10 RX ORDER — TRANEXAMIC ACID 10 MG/ML
1 INJECTION, SOLUTION INTRAVENOUS EVERY 30 MIN PRN
Status: DISCONTINUED | OUTPATIENT
Start: 2022-07-10 | End: 2022-07-12 | Stop reason: HOSPADM

## 2022-07-10 RX ORDER — PROCHLORPERAZINE MALEATE 10 MG
10 TABLET ORAL EVERY 6 HOURS PRN
Status: DISCONTINUED | OUTPATIENT
Start: 2022-07-10 | End: 2022-07-10

## 2022-07-10 RX ORDER — METHYLERGONOVINE MALEATE 0.2 MG/ML
200 INJECTION INTRAVENOUS
Status: DISCONTINUED | OUTPATIENT
Start: 2022-07-10 | End: 2022-07-10

## 2022-07-10 RX ORDER — NALOXONE HYDROCHLORIDE 0.4 MG/ML
0.4 INJECTION, SOLUTION INTRAMUSCULAR; INTRAVENOUS; SUBCUTANEOUS
Status: DISCONTINUED | OUTPATIENT
Start: 2022-07-10 | End: 2022-07-10

## 2022-07-10 RX ADMIN — Medication 340 ML/HR: at 20:06

## 2022-07-10 RX ADMIN — ONDANSETRON 4 MG: 2 INJECTION INTRAMUSCULAR; INTRAVENOUS at 20:40

## 2022-07-10 RX ADMIN — Medication 2 MILLI-UNITS/MIN: at 09:09

## 2022-07-10 RX ADMIN — LIDOCAINE HYDROCHLORIDE AND EPINEPHRINE 5 ML: 15; 5 INJECTION, SOLUTION EPIDURAL at 13:48

## 2022-07-10 RX ADMIN — SODIUM CHLORIDE, POTASSIUM CHLORIDE, SODIUM LACTATE AND CALCIUM CHLORIDE: 600; 310; 30; 20 INJECTION, SOLUTION INTRAVENOUS at 09:09

## 2022-07-10 RX ADMIN — IBUPROFEN 800 MG: 800 TABLET, FILM COATED ORAL at 23:06

## 2022-07-10 RX ADMIN — METHYLERGONOVINE MALEATE 200 MCG: 0.2 INJECTION INTRAVENOUS at 20:15

## 2022-07-10 RX ADMIN — FENTANYL CITRATE 100 MCG: 50 INJECTION, SOLUTION INTRAMUSCULAR; INTRAVENOUS at 13:48

## 2022-07-10 RX ADMIN — Medication: at 13:54

## 2022-07-10 RX ADMIN — Medication 5 ML/HR: at 13:51

## 2022-07-10 RX ADMIN — LIDOCAINE HYDROCHLORIDE 8 ML: 10 INJECTION, SOLUTION INFILTRATION; PERINEURAL at 11:24

## 2022-07-10 ASSESSMENT — ACTIVITIES OF DAILY LIVING (ADL)
ADLS_ACUITY_SCORE: 20

## 2022-07-10 NOTE — PROGRESS NOTES
Patient reported back pain rated a 10/10 and she requested for her epidural to be dosed.     CRNA called at 1342 and epidural was dosed at 1351.    Patient denied any pain shortly after epidural was started. Frequent vital signs stable.    Pit currently running at 12 mU/hr.      Category 1 tracing.     Will continue to monitor.

## 2022-07-10 NOTE — PROGRESS NOTES
"[facility] OB/GYN Department    Labor Note    Date of Admission: 7/10/2022  Name: Gibson Prakash    Subjective:    Patient resting comfortably, hardly feeling contractions.    Objective:    Vital signs:  Temp: 97.5  F (36.4  C) Temp src: Oral BP: 129/66 Pulse: 72   Resp: 16   O2 Device: None (Room air)   Height: 172.7 cm (5' 8\") Weight: 118 kg (260 lb 1.6 oz)  Estimated body mass index is 39.55 kg/m  as calculated from the following:    Height as of this encounter: 1.727 m (5' 8\").    Weight as of this encounter: 118 kg (260 lb 1.6 oz).          FHR:  140s baseline, moderate variability, + accelerations, no decelerations  Uterine Contractions:  q2-3min  Sterile Vaginal Exam:  3/80/-2, AROM at 12:40 with return of clear fluid    Assessment and Plan:    Continue with Pitocin per protocol.      Michelle Card DO    "

## 2022-07-10 NOTE — PROGRESS NOTES
Epidural was placed at 1140, but not dosed.     AROM by Dr. Card at 1242.  SVE 3/80/-2.     Pit is currently running at 8 mU/hr. Patient reports she does not feel her contractions yet.     EFM monitoring; category 1 tracing.

## 2022-07-10 NOTE — ANESTHESIA PROCEDURE NOTES
Epidural catheter Procedure Note    Pre-Procedure   Staff -        CRNA: Chantelle Watson APRN CRNA       Performed By: CRNA       Location: OB       Procedure Start/Stop Times: 7/10/2022 11:13 AM and 7/10/2022 11:40 AM       Pre-Anesthestic Checklist: patient identified, IV checked, risks and benefits discussed, informed consent, monitors and equipment checked, pre-op evaluation and at physician/surgeon's request  Timeout:       Correct Patient: Yes        Correct Procedure: Yes        Correct Site: Yes        Correct Position: Yes   Procedure Documentation  Procedure: epidural catheter       Diagnosis: pain       Patient Position: sitting       Patient Prep/Sterile Barriers: sterile gloves, mask, patient draped       Skin prep: DuraPrep       Local skin infiltrated with 8 mL of 1% lidocaine.        Insertion Site: L3-4. (midline approach).       Technique: LORT saline        CECY at 8 cm.       Needle Type: [employer] needle       Needle Gauge: 17.        Needle Length (Inches): 3.5        Catheter: 19 G.          Catheter threaded easily.           Threaded 12 cm at skin.         # of attempts: 1 and  # of redirects:     Assessment/Narrative         Paresthesias: No.       Test dose of 5 mL lidocaine 1.5% w/ 1:200,000 epinephrine at 13:48 CDT.         Test dose negative, 3 minutes after injection, for signs of intravascular, subdural, or intrathecal injection.       Insertion/Infusion Method: LORT saline       Aspiration negative for Heme or CSF via Epidural Catheter.    Medication(s) Administered   Medication Administration Time: 7/10/2022 11:13 AM     Comments:  1113-placement at this time. Dosing TBD.  1345-patient requesting to have epidural started

## 2022-07-10 NOTE — PROGRESS NOTES
Patient complete 10/100 at 1845.     Patient positioned in semi-fowlers with butterfly legs. Plan to labor down.     Dr. Card is currently in another delivery and is aware.

## 2022-07-10 NOTE — PROGRESS NOTES
Patient arrived to family birth unit at approximately 0755 with her significant other Amy and mother Kina. Patient brought to room 2052 and was orientated to the room and call light.     Patient changed and urine sampled was collected.   Vital signs stable.   Admission completed and paperwork given.   IV access obtained and labs drawn.   SVE 3/80/-3.  Victoria score of 8.   Reactive NST.     Dr. Card saw the patient. Plan to start pit and have epidural placed, but not dosed until patient wants it to be.

## 2022-07-10 NOTE — PROGRESS NOTES
"[facility] OB/GYN Department    Labor Note    Date of Admission: 7/10/2022  Name: Gibson Prakash    Subjective:    Patient resting comfortably with epidural in place.    Objective:    Vital signs:  Temp: 97.5  F (36.4  C) Temp src: Oral BP: 114/57 Pulse: 72   Resp: 16 SpO2: 98 % O2 Device: None (Room air)   Height: 172.7 cm (5' 8\") Weight: 118 kg (260 lb 1.6 oz)  Estimated body mass index is 39.55 kg/m  as calculated from the following:    Height as of this encounter: 1.727 m (5' 8\").    Weight as of this encounter: 118 kg (260 lb 1.6 oz).          FHR:  135s baseline, moderate variability, + accelerations, no decelerations  Uterine Contractions:  Q2min  Sterile Vaginal Exam:  5/90/-2    Assessment and Plan:    Continue with Pitocin per protocol.      Michelle Card DO"

## 2022-07-10 NOTE — ANESTHESIA PREPROCEDURE EVALUATION
Anesthesia Pre-Procedure Evaluation    Patient: Gibson Prakash   MRN: [id number] : 2000        Procedure : * No procedures listed *          Past Medical History:   Diagnosis Date     ADHD      Anxiety      Asthma      Bipolar disorder      Depression      History of urinary tract infection      IV drug user      Methamphetamine abuse      Oppositional defiant disorder       Past Surgical History:   Procedure Laterality Date     NO HISTORY OF SURGERY        No Known Allergies   Social History     Tobacco Use     Smoking status: Current Every Day Smoker     Packs/day: 1.00     Types: Cigarettes     Smokeless tobacco: Never Used     Tobacco comment: down to 2-3 cig/day or vaping ? with pregnancy   Substance Use Topics     Alcohol use: No      Wt Readings from Last 1 Encounters:   07/10/22 118 kg (260 lb 1.6 oz)        Anesthesia Evaluation   Pt has not had prior anesthetic         ROS/MED HX  ENT/Pulmonary: Comment: smoker    (+) Intermittent, asthma     Neurologic:  - neg neurologic ROS     Cardiovascular:       METS/Exercise Tolerance:     Hematologic:     (+) anemia,     Musculoskeletal:       GI/Hepatic:       Renal/Genitourinary:       Endo:     (+) Obesity,     Psychiatric/Substance Use:     (+) psychiatric history bipolar, anxiety, other (comment) and depression alcohol abuse Recreational drug usage: Meth and Cannabis.    Infectious Disease:       Malignancy:       Other:            Physical Exam    Airway        Mallampati: II   TM distance: > 3 FB   Neck ROM: full   Mouth opening: > 3 cm    Respiratory Devices and Support         Dental  no notable dental history         Cardiovascular   cardiovascular exam normal          Pulmonary   pulmonary exam normal                OUTSIDE LABS:  CBC:   Lab Results   Component Value Date    WBC 15.5 (H) 07/10/2022    WBC 12.4 (H) 2022    HGB 11.8 07/10/2022    HGB 11.1 (L) 2022    HCT 35.4 07/10/2022    HCT 34.4 (L) 2022      07/10/2022     05/05/2022     BMP:   Lab Results   Component Value Date     03/13/2020     (H) 04/02/2018    POTASSIUM 3.7 03/13/2020    POTASSIUM 4.4 04/02/2018    CHLORIDE 107 03/13/2020    CHLORIDE 113 (H) 04/02/2018    CO2 29 03/13/2020    CO2 26 04/02/2018    BUN 15 03/13/2020    BUN 13 04/02/2018    CR 0.76 03/13/2020    CR 0.71 04/02/2018    GLC 85 03/13/2020    GLC 94 04/02/2018     COAGS: No results found for: PTT, INR, FIBR  POC:   Lab Results   Component Value Date    HCG Negative 07/04/2019     HEPATIC:   Lab Results   Component Value Date    ALBUMIN 2.9 (L) 04/02/2018    PROTTOTAL 6.1 (L) 04/02/2018    ALT 15 04/02/2018    AST 12 04/02/2018    ALKPHOS 51 04/02/2018    BILITOTAL 0.2 04/02/2018     OTHER:   Lab Results   Component Value Date    LUISANA 8.2 (L) 03/13/2020    TSH 1.10 04/02/2018       Anesthesia Plan    ASA Status:  3      Anesthesia Type: Epidural.              Consents    Anesthesia Plan(s) and associated risks, benefits, and realistic alternatives discussed. Questions answered and patient/representative(s) expressed understanding.    - Discussed:     - Discussed with:  Patient         Postoperative Care            Comments:           neg OB ROS.       PHYLLIS Sanchez CRNA

## 2022-07-10 NOTE — PROGRESS NOTES
Dr. Card saw the patient.   SVE 5/90/-2.     Pit currently running at 14 mU/hr. Patient denied any pain.     Patient positioned on her right side with the peanut ball.     Will continue to monitor.

## 2022-07-10 NOTE — PLAN OF CARE
Patient straight cathed at 1650 with an output of 200 mL.   SVE 6-7/90/-1.     Patient continues to report no pain.     Patient positioned side to side with peanut ball.     Pit running at 16 mU/hr. Category 1 tracing.     Will continue to monitor.

## 2022-07-10 NOTE — H&P
[facility] Department    History and Physical Note    Gibson Prakash  2000  [id number]    HPI: Gibson Prakash is a 22 year old  at 39w2d by 9w5d US, who presents for scheduled elective induction of labor. Reports good fetal movement. No regular contractions, no loss of fluid, no vaginal bleeding.     Pregnancy notable for:  History of meth abuse (UDS negative throughout pregnancy)    OBHX:   OB History    Para Term  AB Living   1 0 0 0 0 0   SAB IAB Ectopic Multiple Live Births   0 0 0 0 0      # Outcome Date GA Lbr [place]/2nd Weight Sex Delivery Anes PTL Lv   1 Current                MedicalHX:   Past Medical History:   Diagnosis Date     ADHD      Anxiety      Asthma      Bipolar disorder      Depression      History of urinary tract infection      IV drug user      Methamphetamine abuse      Oppositional defiant disorder        SurgicalHX:   Past Surgical History:   Procedure Laterality Date     NO HISTORY OF SURGERY         Medications:   No current facility-administered medications on file prior to encounter.  Prenatal Vit-Fe Fumarate-FA (PRENATAL MULTIVITAMIN W/IRON) 27-0.8 MG tablet, Take 1 tablet by mouth daily         Allergies:  No Known Allergies    FamilyHX:  Family History   Problem Relation Age of Onset     Substance Abuse Mother         drugs     Depression Mother      Anxiety Disorder Mother      Substance Abuse Father         drugs     Depression Father      Heart Disease Maternal Grandmother         MIx3     Substance Abuse Paternal Grandmother         alcohol     Substance Abuse Paternal Grandfather         alcohol     Liver Disease Paternal Grandfather      Alcoholism Paternal Grandfather      Substance Abuse Maternal Uncle      Depression Maternal Uncle      Anxiety Disorder Maternal Uncle      Bipolar Disorder Maternal Uncle      Substance Abuse Paternal Uncle        SocialHX:   Social History     Socioeconomic History     Marital status: Single  "  Tobacco Use     Smoking status: Current Every Day Smoker     Packs/day: 1.00     Types: Cigarettes     Smokeless tobacco: Never Used     Tobacco comment: down to 2-3 cig/day or vaping ? with pregnancy   Vaping Use     Vaping Use: Former   Substance and Sexual Activity     Alcohol use: No     Drug use: Yes     Types: Methamphetamines     Comment: iv meth- was in treatment but left yesterday- last use 21     Sexual activity: Yes     Partners: Male     Birth control/protection: Implant       ROS: 10-point ROS negative except as in HPI     Physical Exam:  Vitals:    07/10/22 0803   BP: 123/60   BP Location: Left arm   Pulse: 77   Resp: 16   Temp: 97.9  F (36.6  C)   TempSrc: Oral   Weight: 118 kg (260 lb 1.6 oz)   Height: 1.727 m (5' 8\")     GEN: resting comfortably in bed, NAD   CV: No heaves or thrills. No peripheral varicosities  PULM: Equal expansion bilaterally, no accessory muscle use  ABD: soft, gravid, non-tender, non-distended  EXT: no edema, non-tender to palpation  SVE: 3/80/-2      NST:  FHT: baseline 140 bpm, moderate variability, + accels, no decels  TOCO: none    Labs:        Lab Results   Component Value Date    HEPBANG Nonreactive 2018    CHPCRT Negative 2021    GCPCRT Negative 2021    TREPAB Negative 2018    HGB 11.1 (L) 2022       GBS Status:   Negative    A/P: Gibson Prakash is a 22 year old female  at 39w2d, here for elective induction of labor    Admit to L&D. [facility]. Admission labs. COVID negative.  Labor: Will start Pitocin per protocol, anticipate AROM  FHT: Category 1 FHT.  Continue EFM and toco  Pain: Analgesia PRN, wants epidural  GBS:   negative    Michelle Card DO"

## 2022-07-11 LAB
HGB BLD-MCNC: 9.6 G/DL (ref 11.7–15.7)
T PALLIDUM AB SER QL: NONREACTIVE

## 2022-07-11 PROCEDURE — 120N000001 HC R&B MED SURG/OB

## 2022-07-11 PROCEDURE — 250N000011 HC RX IP 250 OP 636: Performed by: OBSTETRICS & GYNECOLOGY

## 2022-07-11 PROCEDURE — 85018 HEMOGLOBIN: CPT | Performed by: OBSTETRICS & GYNECOLOGY

## 2022-07-11 PROCEDURE — 36415 COLL VENOUS BLD VENIPUNCTURE: CPT | Performed by: OBSTETRICS & GYNECOLOGY

## 2022-07-11 PROCEDURE — 250N000013 HC RX MED GY IP 250 OP 250 PS 637: Performed by: OBSTETRICS & GYNECOLOGY

## 2022-07-11 RX ADMIN — IBUPROFEN 800 MG: 800 TABLET, FILM COATED ORAL at 08:10

## 2022-07-11 RX ADMIN — DOCUSATE SODIUM 100 MG: 100 CAPSULE, LIQUID FILLED ORAL at 08:10

## 2022-07-11 RX ADMIN — ENOXAPARIN SODIUM 40 MG: 100 INJECTION SUBCUTANEOUS at 08:26

## 2022-07-11 RX ADMIN — PRENATAL VITAMINS-IRON FUMARATE 27 MG IRON-FOLIC ACID 0.8 MG TABLET 1 TABLET: at 08:10

## 2022-07-11 RX ADMIN — Medication: at 05:07

## 2022-07-11 RX ADMIN — IBUPROFEN 800 MG: 800 TABLET, FILM COATED ORAL at 15:59

## 2022-07-11 ASSESSMENT — ACTIVITIES OF DAILY LIVING (ADL)
ADLS_ACUITY_SCORE: 20

## 2022-07-11 NOTE — PLAN OF CARE
Data: Vital signs within normal limits. Postpartum checks within normal limits - see flow record. Patient  Is tolerating po intake. Patient is able to empty bladder independently. . Patient ambulating independently..   No apparent signs of infection. perineum healing well. Patient Is performing self cares and Is able to care for infant. Positive attachment behaviors are observed with infant. Support persons are present.  Action:  Pain plan was discussed. Patient will request pain med when she is ready for it. Patient was medicated during the shift for pain. See MAR.Patient education done about breastfeeding,  cares, postpartum cares, pain management/plan,  safety, and rest. See flow record.  Response:   Patient reassessed within 1 hour after each medication for pain. Patient stated that pain had improved. Patient stated that she was comfortable. .   Plan: Anticipate discharge on .

## 2022-07-11 NOTE — CARE PLAN
Walked to bathroom, void x1. Wheeled over to postpartum room at 2245. Voided second time at 0030, IV out. Going out to smoke.

## 2022-07-11 NOTE — PLAN OF CARE
S:Delivery  B:Induced  Labor,39w2d    GBS negative with antibiotic treatment not indicated 4 hours prior to delivery.  A: Patient delivered   none, intact at  with Dr. DONNELL Card in attendance and baby placed on mother's abdomen for delayed cord clamping. Baby dried and stimulated. Baby placed  skin to skin @ . Apgars 8/9.Delivery .  IV infusion of Oxytocin  infused. Placenta removal spontaneous and manual. MD does not want placenta sent to pathology.  See Flowsheet for VS and PP checks. Delivery QBL (mL): 450 mL.  Labor care plan goals met, transition now to postpartum care. Postpartum total .  R: Expect routine postpartum care. Anticipate first feeding within the hour or whenever infant displays feeding cues. Continue skin to skin. Prior discussion with mother indicates that feeding plan is Breast feeding. Educated mother on importance of exclusive breastfeeding, expected feeding readiness cues and encouraged her to observe for these cues while rooming in. Informed her that breastfeeding assistance would be provided.

## 2022-07-11 NOTE — L&D DELIVERY NOTE
Delivery Summary    Gibson Prakash MRN# [id number]   Age: 22 year old YOB: 2000     ASSESSMENT & PLAN:       Gibson Prakash is a 22 year old  presented at 39 weeks 2 days who was admitted to L&D for scheduled elective induction of laobr. Her pregnancy was complicated by history of methamphetamine use. She was started on Pitocin and AROM performed. She progressed through labor until complete dilation. Patient did labor down. She pushed effectively and delivered a viable male infant with Apgars 8/9 over an intact perineum. Weight is pending and skin-to-skin was performed. Delayed cord clamping was performed. Cord was clamped and cut. Placenta delivered via active management and was noted to be intact with a three vessel cord. Dose of Methergine given for some lower uterine segment atony. QBL 450ml. Sponge and needle counts correct. Mom and baby were transported to postpartum in stable condition.          Eloy Prakash-Gibson [[id number]]    Labor Event Times    Labor onset date: 7/10/22 Onset time: 12:42 PM   Dilation complete date: 7/10/22 Complete time:  6:45 PM   Start pushing date/time: 7/10/2022 1946      Labor Events     labor?: No   steroids: None  Labor Type: AROM, Induction/Cervical ripening  Predominate monitoring during 1st stage: continuous electronic fetal monitoring     Antibiotics received during labor?: No     Rupture date/time: 7/10/22 1242   Rupture type: Artificial Rupture of Membranes  Fluid color: Clear  Fluid odor: Normal     Induction: Oxytocin, AROM  Induction date/time: 7/10/22 0909   Cervical ripening date/time:     Indications for induction: Elective     1:1 continuous labor support provided by?: RN       Delivery/Placenta Date and Time    Delivery Date: 7/10/22 Delivery Time:  8:03 PM   Placenta Date/Time: 7/10/2022  8:10 PM  Oxytocin given at the time of delivery: after delivery of placenta  Delivering clinician: Michelle Card,    Other  personnel present at delivery:  Provider Role   Mae Sloan, RN Delivery Nurse   Ashley Reyes RN Delivery Assist   Danielle Suarez, AIDAN Charge Nurse         Vaginal Counts     Initial count performed by 2 team members:  Two Team Members   Myles PRICE RN       [place] Suture Needles Sponges (RETIRED) Instruments   Initial counts 2  5    Added to count       Relief counts       Final counts 2  5          Placed during labor Accounted for at the end of labor   FSE NA    IUPC NA    Cervidil NA               Final count performed by 2 team members:  Two Team Members   mimi suarez k      Final count correct?: Yes     Apgars    Living status: Living   1 Minute 5 Minute 10 Minute 15 Minute 20 Minute   Skin color: 1  1       Heart rate: 2  2       Reflex irritability: 2  2       Muscle tone: 2  2       Respiratory effort: 1  2       Total: 8  9       Apgars assigned by: FREDRICK SLOAN RN     Cord    Vessels: 3 Vessels    Cord Complications: None               Cord Blood Disposition: Lab    Gases Sent?: No    Delayed cord clamping?: Yes    Cord Clamping Delay (seconds):  seconds    Stem cell collection?: No       Skin to Skin and Feeding Plan    Skin to skin initiation date/time: 1/7/1841    Skin to skin end date/time:        Labor Events and Shoulder Dystocia    Fetal Tracing Prior to Delivery: Category 1  Shoulder dystocia present?: Neg     Delivery (Maternal) (Provider to Complete) (586941)    Episiotomy: None  Perineal lacerations: None    Genital tract inspection done: Pos     Blood Loss  Mother: Gibson Prakash #[id number]   Start of Mother's Information    Delivery Blood Loss  07/10/22 1242 - 07/10/22 2025    None           End of Mother's Information  Mother: Gibson Prakash #[id number]          Delivery - Provider to Complete ([id number])    Delivering clinician: Michelle Card DO  Attempted Delivery Types (Choose all that apply): Spontaneous Vaginal Delivery  Delivery Type  (Choose the 1 that will go to the Birth History): Vaginal, Spontaneous                   Other personnel:  Provider Role   Mae Sloan, RN Delivery Nurse   Ashley Reyes, RN Delivery Assist   Danielle Suarez, AIDAN Charge Nurse                Placenta    Date/Time: 7/10/2022  8:10 PM  Removal: Spontaneous  Disposition: Hospital disposal           Anesthesia    Method: Epidural  Cervical dilation at placement: 0-3                Presentation and Position    Presentation: Vertex    Position: Right Occiput Anterior                 Michelle Card DO

## 2022-07-11 NOTE — CONSULTS
"Care Transitions Note:    CTS staff received notification from [facility] RN informing that a cord tissue segment was sent for drug detection panel based on Patient's hx of drug abuse early in pregnancy criteria.    CM spoke with RN in OB this a.m. to receive update and confirm negative drug screen at time of delivery.     Per MD documentation: \"Pregnancy complicated by methamphetamine use in 1st tri.  history of substance abuse for the past 10 years. Used meth 4-5x at the beginning of her pregnancy, then began treatment. She has been sober since beginning treatment and will graduate soon from her program. Negative UDS's throughout pregnancy.\"    CTS to follow for cord tissue results and will consult with Patient during hospital stay to offer continued community resources and support.     GENESIS Haas  [facility] [phone]   [facility]  [phone]  "

## 2022-07-11 NOTE — ANESTHESIA POSTPROCEDURE EVALUATION
Patient: Gibson Prakash    Procedure: * No procedures listed *       Anesthesia Type:  Epidural    Note:  Disposition: Inpatient   Postop Pain Control: Uneventful            Sign Out: Well controlled pain   PONV: No   Neuro/Psych: Uneventful            Sign Out: Acceptable/Baseline neuro status   Airway/Respiratory: Uneventful            Sign Out: Acceptable/Baseline resp. status   CV/Hemodynamics: Uneventful            Sign Out: Acceptable CV status; No obvious hypovolemia; No obvious fluid overload   Other NRE: NONE   DID A NON-ROUTINE EVENT OCCUR? No           Last vitals:  Vitals:    07/10/22 1730 07/10/22 1834 07/10/22 1930   BP: 133/60 121/58 135/73   Pulse: 71     Resp: 16     Temp: 36.4  C (97.6  F)  36.4  C (97.6  F)   SpO2: 97% 96% 98%       Electronically Signed By: PHYLLIS Sanchez CRNA  July 10, 2022  8:31 PM

## 2022-07-11 NOTE — PLAN OF CARE
Goal Outcome Evaluation:    Data: Vital signs within normal limits. Postpartum checks within normal limits - see flow record. Patient is able to empty bladder independently. . Patient ambulating independently. Patient Is performing self cares and Is able to care for infant. Positive attachment behaviors are observed with infant. Support persons are present.  Action:  Pain plan was discussed. Patient will request pain med when she is ready for it.  Plan: Anticipate discharge on 7/12/22.

## 2022-07-11 NOTE — PROGRESS NOTES
"[facility] Daily Postpartum Note    S: Gibson Prakash is feeling well this morning. She denies any complaints. Her pain is well-controlled on pain medications. She tolerating a regular diet without nausea or vomiting. Ambulating without difficulty. Lochia is decreasing. Breastfeeding without questions or concerns.     O:   VS: /50   Pulse 86   Temp 97.5  F (36.4  C) (Oral)   Resp 18   Ht 1.727 m (5' 8\")   Wt 118 kg (260 lb 1.6 oz)   LMP 10/31/2021   SpO2 98%   Breastfeeding Unknown   BMI 39.55 kg/m         I/O last 3 completed shifts:  In: -   Out: 809 [Urine:275; Blood:534]    General: resting in bed, in NAD  CV: reg rate, well perfused  Resp: no increased work of breathing  Abdomen: soft, appropriately tender, nondistended  Fundus firm below the umbilicus  Extremities: non-tender, non-edematous     Recent Labs   Lab 22  0732 07/10/22  0832   HGB 9.6* 11.8       A: Gibson Prakash is a 22 year old  who is PPD#1 s/p , doing well    P:  Continue routine pp cares  Heme VSS, cont to monitor  GI: tolerating regular diet  Feeding: breast  Rh positive  Disposition: routine PP cares, anticipate d/c tomorrow    Eleni Buchanan MD, MD  [facility]   2022 10:51 AM           "

## 2022-07-11 NOTE — PROGRESS NOTES
S:Delivery  B:Induced  Labor,39w2d    No results found for: GBS with antibiotic treatment not indicated 4 hours prior to delivery.  A: Patient delivered   none, intact at  with Dr. DONNELL Card in attendance and baby placed on mother's abdomen for delayed cord clamping. Baby dried and stimulated. Baby placed  skin to skin @ . Apgars 8/9.Delivery .  IV infusion of Oxytocin  infused. Placenta removal spontaneous. MD does not want placenta sent to pathology.  See Flowsheet for VS and PP checks. Delivery QBL (mL): 450 mL.  Labor care plan goals met, transition now to postpartum care. Postpartum QBL TBD.  R: Expect routine postpartum care. Anticipate first feeding within the hour or whenever infant displays feeding cues. Continue skin to skin. Prior discussion with mother indicates that feeding plan is Breast feeding . Educated mother on importance of exclusive breastfeeding, expected feeding readiness cues and encouraged her to observe for these cues while rooming in. Informed her that breastfeeding assistance would be provided.

## 2022-07-12 VITALS
BODY MASS INDEX: 39.42 KG/M2 | SYSTOLIC BLOOD PRESSURE: 124 MMHG | WEIGHT: 260.1 LBS | TEMPERATURE: 97.8 F | RESPIRATION RATE: 18 BRPM | DIASTOLIC BLOOD PRESSURE: 63 MMHG | HEIGHT: 68 IN | OXYGEN SATURATION: 98 % | HEART RATE: 76 BPM

## 2022-07-12 PROBLEM — D62 ANEMIA DUE TO BLOOD LOSS, ACUTE: Status: ACTIVE | Noted: 2022-07-12

## 2022-07-12 PROCEDURE — 250N000011 HC RX IP 250 OP 636: Performed by: OBSTETRICS & GYNECOLOGY

## 2022-07-12 PROCEDURE — 250N000013 HC RX MED GY IP 250 OP 250 PS 637: Performed by: OBSTETRICS & GYNECOLOGY

## 2022-07-12 RX ADMIN — DOCUSATE SODIUM 100 MG: 100 CAPSULE, LIQUID FILLED ORAL at 09:58

## 2022-07-12 RX ADMIN — ENOXAPARIN SODIUM 40 MG: 100 INJECTION SUBCUTANEOUS at 09:58

## 2022-07-12 RX ADMIN — PRENATAL VITAMINS-IRON FUMARATE 27 MG IRON-FOLIC ACID 0.8 MG TABLET 1 TABLET: at 09:58

## 2022-07-12 ASSESSMENT — ACTIVITIES OF DAILY LIVING (ADL)
ADLS_ACUITY_SCORE: 20

## 2022-07-12 NOTE — PROGRESS NOTES
Care Transitions Note:    Received update from [facility]. Pt's EMR has been updated. Pt is no longer listed as having a Legal Guardian.     SW attempted to see the Pt yesterday however was asleep and unable to engage in conversation. Attempted again this morning however the Pt stated she was awake for most of the night with the baby and very sleepy. Pt requested SW return later this morning if able.     Confirmed with Ruben MCLEAN -[facility] CPS that is no current open Child Protection involvement with Pt.     SW spoke Peds Nurse Practitioner this am. Discussed above. Recommended that provider completed a CPS report if concerns are noted. Informed that there are no active and open Child Protection reports in the system as reported by Ruben MCLEAN at [facility]. Baby's cord tissue results remain pending.     DIANE discussed with Beside RN- confirmed that nursing department to follow-up with Home Care referral at time of discharge.     Addendum 07/12/22  1300    CM met with the Pt and TFIFANYNatashaElkin in room. Pt states she spoke with the bedside RN about a Home Care referral, states she has no further questions regarding the home care and is looking forward to their visit in the home.     Pt confirms she has adequate support and resources through the [facility]. Enrolled Medical Assistance, WIC and EBT through [facility]. States she has kept her father's address throughout her pregnancy with recommendations of her [facility] . Plans to change her address now that the baby is born.     Pt states her parents, Elkin's parents and her Grandmother--works as a Labor & Delivery RN at [employer] are very supportive and involved in care needs. Pt states she feels she has all needed baby supplies and equipment.     Pt lives with Alfonso in [place]. [facility] she is planning to graduate from her CD tx program on July 19th. Currently attends on a outpatient basis. States she plans to establish follow-up MICD follow up as  indicated by her providers.     Patient and Bon[place] requested information on Paternity testing. Since Pt currently receives service through [facility],     [facility] Child Support provides Paternity services:  [email or web address]  Phone: [phone]  -Establish paternity, including genetic testing  -Establish court orders for child support  -Collect child support and enforce court orders  -Help families modify court orders when things change      Pt declines any further CM involved and need for further resources/support. Pt plans to discharge home with baby.     CTS Dept will continue to follow CTS to follow for cord tissue results.      GENESIS Haas  [facility] [phone]   [facility] [place]  [phone]

## 2022-07-12 NOTE — DISCHARGE INSTRUCTIONS
[facility] Child Support provides Paternity services:  [email or web address]  Phone: [phone]    -Establish paternity, including genetic testing  -Establish court orders for child support  -Collect child support and enforce court orders  -Help families modify court orders when things change    Postpartum Vaginal Delivery Instructions    Activity     Ask family and friends for help when you need it.  Do not place anything in your vagina for 6 weeks.  You are not restricted on other activities, but take it easy for a few weeks to allow your body to recover from delivery.  You are able to do any activities you feel up to that point.  No driving until you have stopped taking your pain medications (usually two weeks after delivery).     Call your health care provider if you have any of these symptoms:     Increased pain, swelling, redness, or fluid around your stiches from an episiotomy or perineal tear.  A fever above 100.4 F (38 C) with or without chills when placing a thermometer under your tongue.  You soak a sanitary pad with blood within 1 hour, or you see blood clots larger than a golf ball.  Bleeding that lasts more than 6 weeks.  Vaginal discharge that smells bad.  Severe pain, cramping or tenderness in your lower belly area.  A need to urinate more frequently (use the toilet more often), more urgently (use the toilet very quickly), or it burns when you urinate.  Nausea and vomiting.  Redness, swelling or pain around a vein in your leg.  Problems breastfeeding or a red or painful area on your breast.  Chest pain and cough or are gasping for air.  Problems coping with sadness, anxiety, or depression.  If you have any concerns about hurting yourself or the baby, call your provider immediately.   You have questions or concerns after you return home.     Keep your hands clean:  Always wash your hands before touching your perineal area and stitches.  This helps reduce your risk of infection.  If your  hands aren't dirty, you may use an alcohol   hand-rub to clean your hands. Keep your nails clean and short.      If you have concerns/questions after returning home, contact the nurse triage line [phone] or your primary care clinic [phone]     If you you feel sad, have difficulty sleeping, feel overwhelmed, anxious or have troubling thoughts you may call your clinic, or the HelpLine for Pregnancy & Postpartum Support [place]. ([facility] HelpLine [phone]) or online resource list  @ www.[place]support[place].org

## 2022-07-12 NOTE — PLAN OF CARE
Goal Outcome Evaluation:    Patient discharged per ambulatory with infant in car seat. Mother verified that her band matches her infant's band by comparing the infant's #.  Discharge instructions given. Encouraged to call for any problems, questions or concerns. RXs none.

## 2022-07-12 NOTE — PROGRESS NOTES
[facility] Obstetrics Post-Partum Progress Note          Assessment and Plan:    Assessment:   Post-partum day #2  Normal spontaneous vaginal delivery  L&D complications: Acute blood loss anemia, well tolerated      Doing well.  No excessive bleeding  Pain well-controlled.      Plan:   Discharge today           Interval History:   Doing well.  Pain is well-controlled.  No fevers.  No history of foul-smelling vaginal discharge.  Good appetite.  Denies chest pain, shortness of breath, nausea or vomiting.  Vaginal bleeding is similar to a heavy menstrual flow.  Ambulatory.  Breastfeeding well.          Significant Problems:    Active Problems:     (spontaneous vaginal delivery)            Review of Systems:    The patient denies any chest pain, shortness of breath, excessive pain, fever, chills, purulent drainage from the wound, nausea or vomiting.          Medications:   All medications related to the patient's surgery have been reviewed          Physical Exam:     All vitals stable  Patient Vitals for the past 24 hrs:   BP Temp Temp src Pulse Resp   22 1000 124/63 97.8  F (36.6  C) Oral 76 18   22 2345 130/49 98.2  F (36.8  C) Oral 86 --   22 1542 111/48 97.9  F (36.6  C) Oral 81 18     Uterine fundus is firm, non-tender and at the level of the umbilicus          Data:     All laboratory data related to this surgery reviewed  Hemoglobin   Date Value Ref Range Status   2022 9.6 (L) 11.7 - 15.7 g/dL Final   07/10/2022 11.8 11.7 - 15.7 g/dL Final   2022 11.1 (L) 11.7 - 15.7 g/dL Final   2020 14.1 11.7 - 15.7 g/dL Final   2019 13.6 11.7 - 15.7 g/dL Final   2018 12.7 11.7 - 15.7 g/dL Final   2014 14.6 11.7 - 15.7 g/dL Final   10/28/2013 13.2 11.7 - 15.7 g/dL Final     No imaging studies have been ordered    Linda Garrett MD

## 2022-07-12 NOTE — PLAN OF CARE
Goal Outcome Evaluation:    Pt states she has decided to pump and bottle feed baby. Will use formula until her milk comes in.

## 2022-07-12 NOTE — DISCHARGE SUMMARY
[facility] Discharge Summary    Gibson Prakash MRN# [id number]   Age: 22 year old YOB: 2000     Date of Admission:  7/10/2022  Date of Discharge::  2022  Admitting Physician:  Eleni Buchanan MD  Discharge Physician:  Linda Garrett MD     Home clinic: [facility]          Admission Diagnoses:   Prenatal care, third trimester [Z34.93]          Discharge Diagnosis:   Normal spontaneous vaginal delivery  Intrauterine pregnancy at 39 weeks gestation  Acute blood loss anemia          Procedures:   Procedure(s): No additional procedures performed       No other procedures performed during this admission           Medications Prior to Admission:     Medications Prior to Admission   Medication Sig Dispense Refill Last Dose     Prenatal Vit-Fe Fumarate-FA (PRENATAL MULTIVITAMIN W/IRON) 27-0.8 MG tablet Take 1 tablet by mouth daily    Past Month at Unknown time             Discharge Medications:     Current Discharge Medication List      CONTINUE these medications which have NOT CHANGED    Details   Prenatal Vit-Fe Fumarate-FA (PRENATAL MULTIVITAMIN W/IRON) 27-0.8 MG tablet Take 1 tablet by mouth daily                    Consultations:   No consultations were requested during this admission          Brief History of Labor:       Gibson Prakash is a 22 year old  presented at 39 weeks 2 days who was admitted to L&D for scheduled elective induction of laobr. Her pregnancy was complicated by history of methamphetamine use. She was started on Pitocin and AROM performed. She progressed through labor until complete dilation. Patient did labor down. She pushed effectively and delivered a viable male infant with Apgars 8/9 over an intact perineum. Weight is pending and skin-to-skin was performed. Delayed cord clamping was performed. Cord was clamped and cut. Placenta delivered via active management and was noted to be intact with a three  vessel cord. Dose of Methergine given for some lower uterine segment atony. QBL 450ml. Sponge and needle counts correct. Mom and baby were transported to postpartum in stable condition.           Hospital Course:   The patient's hospital course was unremarkable.  On discharge, her pain was well controlled. Vaginal bleeding is similar to peak menstrual flow.  Voiding without difficulty.  Ambulating well and tolerating a normal diet.  No fever.  Breastfeeding well.  Infant is stable.  No bowel movement yet.*  She was discharged on post-partum day #2.    Post-partum hemoglobin:   Hemoglobin   Date Value Ref Range Status   07/11/2022 9.6 (L) 11.7 - 15.7 g/dL Final   03/13/2020 14.1 11.7 - 15.7 g/dL Final             Discharge Instructions and Follow-Up:   Discharge diet: Regular   Discharge activity: Activity as tolerated   Discharge follow-up: Follow up with OB in 6 weeks   Wound care: Drink plenty of fluids           Discharge Disposition:   Discharged to home      Attestation:  I have reviewed today's vital signs, notes, medications, labs and imaging.    Linda Garrett MD

## 2022-07-12 NOTE — PLAN OF CARE
Data: Vital signs within normal limits. Postpartum checks within normal limits - see flow record. Patient  Is tolerating po intake. Patient is able to empty bladder independently. . Patient ambulating independently. No apparent signs of infection. perineum healing well. Nipples tender and sore. Patient Is performing self cares and Is able to care for infant. Positive attachment behaviors are observed with infant. Support persons are present.  Action:  Pain plan was discussed. Patient will request pain med when she is ready for it. Patient was not medicated during the shift for pain. See MAR. Lanolin and gel pads given. Patient education done about breastfeeding, formula feeding,  cares, postpartum cares, pain management/plan, and  safety. See flow record.  Plan: Anticipate discharge on -.

## 2022-07-13 ENCOUNTER — PATIENT OUTREACH (OUTPATIENT)
Dept: CARE COORDINATION | Facility: CLINIC | Age: 22
End: 2022-07-13

## 2022-07-13 DIAGNOSIS — Z71.89 OTHER SPECIFIED COUNSELING: ICD-10-CM

## 2022-07-13 NOTE — PROGRESS NOTES
Clinic Care Coordination Contact  [facility]/Voicemail    Clinical Data: Care Coordinator Outreach  Reason for referral: TCM outreach  Outreach attempted x 1.  Unable to leave message on patient's voicemail with call back information or request return call.   Plan: Care Coordinator will try to reach patient again in 1-2 business days.    Siobhan Barker  Community Health Worker  [facility] [place]  Ph: [phone]

## 2022-07-14 NOTE — PROGRESS NOTES
"Clinic Care Coordination Contact  [facility]: Post-Discharge Note  SITUATION                                                      Admission:    Admission Date: 07/10/22   Reason for Admission: Prenatal care, third trimester  Discharge:   Discharge Date: 22  Discharge Diagnosis: Normal spontaneous vaginal delivery, intrauterine pregnancy at 39 weeks gestation, acute blood loss anemia    BACKGROUND                                                      Per hospital discharge summary and inpatient provider notes:    Gibson Prakash is a 22 year old  presented at 39 weeks 2 days who was admitted to L&D for scheduled elective induction of laobr. Her pregnancy was complicated by history of methamphetamine use. She was started on Pitocin and AROM performed. She progressed through labor until complete dilation. Patient did labor down. She pushed effectively and delivered a viable male infant with Apgars 8/9 over an intact perineum. Weight is pending and skin-to-skin was performed. Delayed cord clamping was performed. Cord was clamped and cut. Placenta delivered via active management and was noted to be intact with a three vessel cord. Dose of Methergine given for some lower uterine segment atony. QBL 450ml. Sponge and needle counts correct. Mom and baby were transported to postpartum in stable condition.     ASSESSMENT      Enrollment  Primary Care Care Coordination Status: Not a Candidate (Patient sees an OB with [facility] but a PCP with [facility])    Discharge Assessment  How are you doing now that you are home?: \"It's going good.\"  How are your symptoms? (Red Flag symptoms escalate to triage hotline per guidelines): Improved  Do you feel your condition is stable enough to be safe at home until your provider visit?: Yes  Does the patient have their discharge instructions? : Yes  Does the patient have questions regarding their discharge instructions? : No  Were you started on any new medications or " were there changes to any of your previous medications? : No  Does the patient have all of their medications?: Yes  Do you have questions regarding any of your medications? : No  Do you have all of your needed medical supplies or equipment (DME)?  (i.e. oxygen tank, CPAP, cane, etc.): Yes (Breast pump, working ok)  Discharge follow-up appointment scheduled within 14 calendar days? : No  Is patient agreeable to assistance with scheduling? : No (Patient stated she does not need the phone number to call to schedule follow up with OB)    Post-op ([facility] Only)  If the patient had a surgery or procedure, do they have any questions for a nurse?: No      PLAN                                                      Outpatient Plan:      Follow up with OB in 6 weeks    No future appointments.      For any urgent concerns, please contact our 24 hour nurse triage line: [phone] ([phone])       Siobhan Barker  Community Health Worker  [facility]  Ph: [phone]

## 2022-08-10 ENCOUNTER — MEDICAL CORRESPONDENCE (OUTPATIENT)
Dept: HEALTH INFORMATION MANAGEMENT | Facility: CLINIC | Age: 22
End: 2022-08-10

## 2022-09-02 ENCOUNTER — MEDICAL CORRESPONDENCE (OUTPATIENT)
Dept: OBGYN | Facility: CLINIC | Age: 22
End: 2022-09-02

## 2022-09-02 ENCOUNTER — PRENATAL OFFICE VISIT (OUTPATIENT)
Dept: OBGYN | Facility: CLINIC | Age: 22
End: 2022-09-02
Payer: COMMERCIAL

## 2022-09-02 VITALS
SYSTOLIC BLOOD PRESSURE: 122 MMHG | WEIGHT: 231 LBS | BODY MASS INDEX: 35.12 KG/M2 | DIASTOLIC BLOOD PRESSURE: 71 MMHG | TEMPERATURE: 98.7 F | HEART RATE: 86 BPM

## 2022-09-02 DIAGNOSIS — Z30.430 ENCOUNTER FOR IUD INSERTION: Primary | ICD-10-CM

## 2022-09-02 DIAGNOSIS — Z30.430 ENCOUNTER FOR INSERTION OF INTRAUTERINE CONTRACEPTIVE DEVICE: ICD-10-CM

## 2022-09-02 LAB
HCG UR QL: NEGATIVE
INTERNAL QC OK POCT: NORMAL
POCT KIT EXPIRATION DATE: NORMAL
POCT KIT LOT NUMBER: NORMAL

## 2022-09-02 PROCEDURE — 58300 INSERT INTRAUTERINE DEVICE: CPT | Performed by: ADVANCED PRACTICE MIDWIFE

## 2022-09-02 PROCEDURE — 81025 URINE PREGNANCY TEST: CPT | Performed by: ADVANCED PRACTICE MIDWIFE

## 2022-09-02 PROCEDURE — 99207 PR POST PARTUM EXAM: CPT | Performed by: ADVANCED PRACTICE MIDWIFE

## 2022-09-02 ASSESSMENT — ANXIETY QUESTIONNAIRES
IF YOU CHECKED OFF ANY PROBLEMS ON THIS QUESTIONNAIRE, HOW DIFFICULT HAVE THESE PROBLEMS MADE IT FOR YOU TO DO YOUR WORK, TAKE CARE OF THINGS AT HOME, OR GET ALONG WITH OTHER PEOPLE: NOT DIFFICULT AT ALL
7. FEELING AFRAID AS IF SOMETHING AWFUL MIGHT HAPPEN: NOT AT ALL
GAD7 TOTAL SCORE: 7
1. FEELING NERVOUS, ANXIOUS, OR ON EDGE: SEVERAL DAYS
5. BEING SO RESTLESS THAT IT IS HARD TO SIT STILL: NOT AT ALL
2. NOT BEING ABLE TO STOP OR CONTROL WORRYING: MORE THAN HALF THE DAYS
6. BECOMING EASILY ANNOYED OR IRRITABLE: SEVERAL DAYS
GAD7 TOTAL SCORE: 7
3. WORRYING TOO MUCH ABOUT DIFFERENT THINGS: MORE THAN HALF THE DAYS

## 2022-09-02 ASSESSMENT — PATIENT HEALTH QUESTIONNAIRE - PHQ9
SUM OF ALL RESPONSES TO PHQ QUESTIONS 1-9: 4
5. POOR APPETITE OR OVEREATING: SEVERAL DAYS

## 2022-09-02 NOTE — NURSING NOTE
"Initial /71 (BP Location: Right arm, Patient Position: Chair, Cuff Size: Adult Large)   Pulse 86   Temp 98.7  F (37.1  C) (Tympanic)   Wt 104.8 kg (231 lb)   LMP 10/31/2021   Breastfeeding No   BMI 35.12 kg/m   Estimated body mass index is 35.12 kg/m  as calculated from the following:    Height as of 7/10/22: 1.727 m (5' 8\").    Weight as of this encounter: 104.8 kg (231 lb). .    Stephanie Herring MA    "
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
## 2022-09-10 ENCOUNTER — HEALTH MAINTENANCE LETTER (OUTPATIENT)
Age: 22
End: 2022-09-10

## 2022-11-03 ENCOUNTER — HOSPITAL ENCOUNTER (EMERGENCY)
Facility: CLINIC | Age: 22
Discharge: HOME | End: 2022-11-03
Attending: PHYSICIAN ASSISTANT | Admitting: PHYSICIAN ASSISTANT
Payer: COMMERCIAL

## 2022-11-03 ENCOUNTER — APPOINTMENT (OUTPATIENT)
Dept: GENERAL RADIOLOGY | Facility: CLINIC | Age: 22
End: 2022-11-03
Attending: PHYSICIAN ASSISTANT
Payer: COMMERCIAL

## 2022-11-03 VITALS
SYSTOLIC BLOOD PRESSURE: 125 MMHG | HEART RATE: 98 BPM | RESPIRATION RATE: 20 BRPM | DIASTOLIC BLOOD PRESSURE: 86 MMHG | TEMPERATURE: 98 F | OXYGEN SATURATION: 98 %

## 2022-11-03 DIAGNOSIS — S90.02XA CONTUSION OF LEFT ANKLE, INITIAL ENCOUNTER: ICD-10-CM

## 2022-11-03 DIAGNOSIS — M25.511 RIGHT SHOULDER PAIN: ICD-10-CM
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
## 2022-11-03 PROCEDURE — 73030 X-RAY EXAM OF SHOULDER: CPT | Mod: RT

## 2022-11-03 PROCEDURE — 99213 OFFICE O/P EST LOW 20 MIN: CPT | Performed by: PHYSICIAN ASSISTANT

## 2022-11-03 PROCEDURE — G0463 HOSPITAL OUTPT CLINIC VISIT: HCPCS | Performed by: PHYSICIAN ASSISTANT

## 2022-11-03 PROCEDURE — 73610 X-RAY EXAM OF ANKLE: CPT | Mod: LT

## 2022-11-04 ASSESSMENT — ENCOUNTER SYMPTOMS
COLOR CHANGE: 0
CHILLS: 0
DIZZINESS: 0
VOMITING: 0
SHORTNESS OF BREATH: 0
SORE THROAT: 0
SPEECH DIFFICULTY: 0
COUGH: 0
PALPITATIONS: 0
ARTHRALGIAS: 1
DIARRHEA: 0
NUMBNESS: 0
WHEEZING: 0
WOUND: 1
HEADACHES: 0
ABDOMINAL PAIN: 0
WEAKNESS: 0
PHOTOPHOBIA: 0
NAUSEA: 0
SEIZURES: 0
FEVER: 0
LIGHT-HEADEDNESS: 0
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
## 2022-12-31 ENCOUNTER — HOSPITAL ENCOUNTER (EMERGENCY)
Facility: CLINIC | Age: 22
Discharge: HOME | End: 2022-12-31
Attending: PHYSICIAN ASSISTANT | Admitting: PHYSICIAN ASSISTANT
Payer: COMMERCIAL

## 2022-12-31 VITALS
TEMPERATURE: 97.5 F | SYSTOLIC BLOOD PRESSURE: 132 MMHG | HEART RATE: 73 BPM | DIASTOLIC BLOOD PRESSURE: 65 MMHG | OXYGEN SATURATION: 98 % | RESPIRATION RATE: 20 BRPM | BODY MASS INDEX: 27.37 KG/M2 | WEIGHT: 180 LBS

## 2022-12-31 DIAGNOSIS — J06.9 VIRAL URI WITH COUGH: ICD-10-CM

## 2022-12-31 LAB
FLUAV RNA SPEC QL NAA+PROBE: NEGATIVE
FLUBV RNA RESP QL NAA+PROBE: NEGATIVE
RSV RNA SPEC NAA+PROBE: NEGATIVE
SARS-COV-2 RNA RESP QL NAA+PROBE: NEGATIVE

## 2022-12-31 PROCEDURE — G0463 HOSPITAL OUTPT CLINIC VISIT: HCPCS | Mod: CS | Performed by: PHYSICIAN ASSISTANT

## 2022-12-31 PROCEDURE — 99212 OFFICE O/P EST SF 10 MIN: CPT | Mod: CS | Performed by: PHYSICIAN ASSISTANT

## 2022-12-31 PROCEDURE — C9803 HOPD COVID-19 SPEC COLLECT: HCPCS | Performed by: PHYSICIAN ASSISTANT

## 2022-12-31 PROCEDURE — 87637 SARSCOV2&INF A&B&RSV AMP PRB: CPT | Performed by: PHYSICIAN ASSISTANT

## 2022-12-31 NOTE — ED PROVIDER NOTES
History     Chief Complaint   Patient presents with     Cough     HPI  Gibson Prakash is a 22 year old female who presents to urgent care with concern over 2-day history of nasal congestion, sore throat, cough.  She denies any fever, chills, allergies, dyspnea, wheezing, vomiting, diarrhea or abdominal complaints.  She does have possible contact he developed URI symptoms 1 day prior to her and did have contact with somebody who was recently diagnosed with strep throat.  She has not attempted any OTC treatments.  She does have a history of asthma and is a smoker.      Allergies:  No Known Allergies    Problem List:    Patient Active Problem List    Diagnosis Date Noted     Major depression 2014     Priority: High     Unspecified disruptive, impulse-control, and conduct disorder 2014     Priority: High     Parent-child relational problem 2014     Priority: High     Anemia due to blood loss, acute 2022     Priority: Medium      (spontaneous vaginal delivery) 07/10/2022     Priority: Medium     Cannabis use disorder, mild 2018     Priority: Medium     Alcohol use disorder, mild, in sustained remission 2018     Priority: Medium     Vitamin D deficiency 2018     Priority: Medium     History of Attention-deficit/hyperactivity disorder 2018     Priority: Medium     Unspecified trauma- and stressor-related disorder 2018     Priority: Medium     Amphetamine-type substance use disorder, severe 2018     Priority: Medium     Intermittent asthma 2014     Priority: Medium     Attention deficit hyperactivity disorder (ADHD) 2014     Priority: Medium     Problem list name updated by automated process. Provider to review       Methamphetamine abuse (H) 2014     Priority: Medium     Unspecified depressive disorder 2014     Priority: Medium     Tobacco use disorder, mild 01/10/2014     Priority: Medium      Past Medical History:    Past Medical  History:   Diagnosis Date     ADHD      Anxiety      Asthma      Bipolar disorder      Depression      History of urinary tract infection      IV drug user      Methamphetamine abuse      Oppositional defiant disorder        Past Surgical History:    Past Surgical History:   Procedure Laterality Date     NO HISTORY OF SURGERY         Family History:    Family History   Problem Relation Age of Onset     Substance Abuse Mother         drugs     Depression Mother      Anxiety Disorder Mother      Substance Abuse Father         drugs     Depression Father      Heart Disease Maternal Grandmother         MIx3     Substance Abuse Paternal Grandmother         alcohol     Substance Abuse Paternal Grandfather         alcohol     Liver Disease Paternal Grandfather      Alcoholism Paternal Grandfather      Substance Abuse Maternal Uncle      Depression Maternal Uncle      Anxiety Disorder Maternal Uncle      Bipolar Disorder Maternal Uncle      Substance Abuse Paternal Uncle        Social History:  Marital Status:  Single [1]  Social History     Tobacco Use     Smoking status: Every Day     Packs/day: 1.00     Types: Cigarettes     Smokeless tobacco: Never     Tobacco comments:     down to 2-3 cig/day or vaping ? with pregnancy   Vaping Use     Vaping Use: Former   Substance Use Topics     Alcohol use: No     Drug use: Yes     Types: Methamphetamines     Comment: iv meth- was in treatment but left yesterday- last use 1/16/21      Medications:    levonorgestrel (MIRENA) 20 MCG/DAY IUD      Review of Systems  CONSTITUTIONAL:NEGATIVE for fever, chills, change in weight  INTEGUMENTARY/SKIN: NEGATIVE for worrisome rashes, moles or lesions  EYES: NEGATIVE for vision changes or irritation  ENT/MOUTH: POSITIVE for nasal congestion and sore throat NEGATIVE for ear pain   RESP:POSITIVE for cough and NEGATIVE for SOB/dyspnea and wheezing  GI: NEGATIVE for nausea, vomiting, diarrhea, abdominal pain   Physical Exam   BP: 132/65  Pulse:  73  Temp: 97.5  F (36.4  C)  Resp: 20  Weight: 81.6 kg (180 lb)  SpO2: 98 %  Physical Exam  GENERAL APPEARANCE: healthy, alert and no distress  EYES: EOMI,  PERRL, conjunctiva clear  HENT: ear canals and TM's normal.  Nose and mouth without ulcers, erythema or lesions  NECK: supple, nontender, no lymphadenopathy  RESP: lungs clear to auscultation - no rales, rhonchi or wheezes  CV: regular rates and rhythm, normal S1 S2, no murmur noted  SKIN: no suspicious lesions or rashes  ED Course           Procedures       Critical Care time:  none        Results for orders placed or performed during the hospital encounter of 12/31/22 (from the past 24 hour(s))   Symptomatic Influenza A/B & SARS-CoV2 (COVID-19) Virus PCR Multiplex Nasopharyngeal    Specimen: Nasopharyngeal; Swab   Result Value Ref Range    Influenza A PCR Negative Negative    Influenza B PCR Negative Negative    RSV PCR Negative Negative    SARS CoV2 PCR Negative Negative    Narrative    Testing was performed using the Xpert Xpress CoV2/Flu/RSV Assay on the [employer] GeneXpert Instrument. This test should be ordered for the detection of SARS-CoV-2 and influenza viruses in individuals who meet clinical and/or epidemiological criteria. Test performance is unknown in asymptomatic patients. This test is for in vitro diagnostic use under the FDA EUA for laboratories certified under CLIA to perform high or moderate complexity testing. This test has not been FDA cleared or approved. A negative result does not rule out the presence of PCR inhibitors in the specimen or target RNA in concentration below the limit of detection for the assay. If only one viral target is positive but coinfection with multiple targets is suspected, the sample should be re-tested with another FDA cleared, approved, or authorized test, if coinfection would change clinical management. This test was validated by the [facility]. These laboratories are certified under the  Clinical Laboratory Improvement Amendments of 1988 (CLIA-88) as qualified to perform high complexity laboratory testing.     Medications - No data to display    Assessments & Plan (with Medical Decision Making)     I have reviewed the nursing notes.  I have reviewed the findings, diagnosis, plan and need for follow up with the patient.       Medical Decision Making  The patient presented with a problem that is acute and uncomplicated.    The patient's evaluation involved:  ordering and review of 1 test(s) (see separate area of note for details)    The patient's management involved only simple and very low risk treatment.    New Prescriptions    No medications on file     Final diagnoses:   Viral URI with cough     22-year-old female presents to urgent care with concern over 2-day history of sore throat, nasal congestion, cough.  She had stable vital signs upon arrival.  Benign physical exam findings.  Given contact she did have influenza, COVID-19, RSV testing which was obtained and pending at time of discharge which were all ultimately negative.  I have low suspicion for strep given her URI symptoms despite contact with individual who was recently diagnosed.  I did discuss her/benefits of testing for strep and patient elected to defer at this time.  I do not suspect bronchitis, pneumonia, pertussis, PE.  She was discharged home stable with instructions for symptomatic treatment.  Follow-up with primary care provider if no improvement within the next week.  Worrisome reasons to return to the ER/UC sooner discussed.    Disclaimer: This note consists of symbols derived from keyboarding, dictation, and/or voice recognition software. As a result, there may be errors in the script that have gone undetected.  Please consider this when interpreting information found in the chart.    12/31/2022   [facility] EMERGENCY DEPT     Bernice Ledesma PA-C  12/31/22 [id number]

## 2023-01-13 ENCOUNTER — HOSPITAL ENCOUNTER (EMERGENCY)
Facility: CLINIC | Age: 23
Discharge: HOME | End: 2023-01-13
Attending: PHYSICIAN ASSISTANT | Admitting: PHYSICIAN ASSISTANT
Payer: COMMERCIAL

## 2023-01-13 VITALS
HEART RATE: 72 BPM | OXYGEN SATURATION: 100 % | DIASTOLIC BLOOD PRESSURE: 79 MMHG | TEMPERATURE: 97.3 F | RESPIRATION RATE: 16 BRPM | SYSTOLIC BLOOD PRESSURE: 139 MMHG

## 2023-01-13 DIAGNOSIS — N76.0 BACTERIAL VAGINOSIS: ICD-10-CM

## 2023-01-13 DIAGNOSIS — Z20.2 POSSIBLE EXPOSURE TO STD: ICD-10-CM

## 2023-01-13 DIAGNOSIS — B96.89 BACTERIAL VAGINOSIS: ICD-10-CM

## 2023-01-13 LAB
ALBUMIN UR-MCNC: NEGATIVE MG/DL
APPEARANCE UR: CLEAR
BILIRUB UR QL STRIP: NEGATIVE
CLUE CELLS: PRESENT
COLOR UR AUTO: YELLOW
GLUCOSE UR STRIP-MCNC: NEGATIVE MG/DL
HGB UR QL STRIP: NEGATIVE
KETONES UR STRIP-MCNC: NEGATIVE MG/DL
LEUKOCYTE ESTERASE UR QL STRIP: NEGATIVE
MUCOUS THREADS #/AREA URNS LPF: PRESENT /LPF
NITRATE UR QL: NEGATIVE
PH UR STRIP: 7 [PH] (ref 5–7)
RBC URINE: 0 /HPF
SP GR UR STRIP: 1.02 (ref 1–1.03)
SQUAMOUS EPITHELIAL: 1 /HPF
TRICHOMONAS, WET PREP: ABNORMAL
UROBILINOGEN UR STRIP-MCNC: NORMAL MG/DL
WBC URINE: 1 /HPF
WBC'S/HIGH POWER FIELD, WET PREP: ABNORMAL
YEAST, WET PREP: ABNORMAL

## 2023-01-13 PROCEDURE — 87210 SMEAR WET MOUNT SALINE/INK: CPT | Performed by: PHYSICIAN ASSISTANT

## 2023-01-13 PROCEDURE — 81001 URINALYSIS AUTO W/SCOPE: CPT | Performed by: PHYSICIAN ASSISTANT

## 2023-01-13 PROCEDURE — 250N000011 HC RX IP 250 OP 636: Performed by: PHYSICIAN ASSISTANT

## 2023-01-13 PROCEDURE — 250N000009 HC RX 250: Performed by: PHYSICIAN ASSISTANT

## 2023-01-13 PROCEDURE — 250N000013 HC RX MED GY IP 250 OP 250 PS 637: Performed by: PHYSICIAN ASSISTANT

## 2023-01-13 PROCEDURE — 87491 CHLMYD TRACH DNA AMP PROBE: CPT | Performed by: PHYSICIAN ASSISTANT

## 2023-01-13 PROCEDURE — 99213 OFFICE O/P EST LOW 20 MIN: CPT | Performed by: PHYSICIAN ASSISTANT

## 2023-01-13 PROCEDURE — 87591 N.GONORRHOEAE DNA AMP PROB: CPT | Performed by: PHYSICIAN ASSISTANT

## 2023-01-13 PROCEDURE — G0463 HOSPITAL OUTPT CLINIC VISIT: HCPCS | Mod: 25 | Performed by: PHYSICIAN ASSISTANT

## 2023-01-13 PROCEDURE — 96372 THER/PROPH/DIAG INJ SC/IM: CPT | Performed by: PHYSICIAN ASSISTANT

## 2023-01-13 RX ORDER — AZITHROMYCIN 250 MG/1
1000 TABLET, FILM COATED ORAL ONCE
Status: COMPLETED | OUTPATIENT
Start: 2023-01-13 | End: 2023-01-13

## 2023-01-13 RX ORDER — METRONIDAZOLE 7.5 MG/G
1 GEL VAGINAL DAILY
Qty: 25 G | Refills: 0 | Status: SHIPPED | OUTPATIENT
Start: 2023-01-13 | End: 2023-01-18

## 2023-01-13 RX ADMIN — AZITHROMYCIN MONOHYDRATE 1000 MG: 250 TABLET ORAL at 15:28

## 2023-01-13 RX ADMIN — LIDOCAINE HYDROCHLORIDE 500 MG: 10 INJECTION, SOLUTION EPIDURAL; INFILTRATION; INTRACAUDAL; PERINEURAL at 15:32

## 2023-01-13 ASSESSMENT — ACTIVITIES OF DAILY LIVING (ADL): ADLS_ACUITY_SCORE: 37

## 2023-01-13 ASSESSMENT — ENCOUNTER SYMPTOMS: CONSTITUTIONAL NEGATIVE: 1

## 2023-01-13 NOTE — ED PROVIDER NOTES
"  History     Chief Complaint   Patient presents with     STD     Patient states she had intercourse a couple of weeks ago and there was blood. 2-3 hours later, patient had dark brown discharge. Patient reports that she had lower abdominal pain after intercourse. Patient states that she has not had any dysuria. Patient has no known exposure but states that her partner has been with others.     WILLIAM Prakash is a 22 year old female who presents with complaints of possible STD exposure.  Patient states she had unprotected sex with her boyfriend and her best friend.  Patient states her boyfriend is known to be \"sleeping around.\"  She is concerned she may have gonorrhea.  Patient complains of a dark brown vaginal discharge and intermittent vaginal spotting especially after intercourse.  Patient has a 6-month-old baby and has had her period multiple times since she delivered him.  Denies fevers, chills, nausea, vomiting, diarrhea, dysuria, or increased urinary urgency.      Allergies:  No Known Allergies    Problem List:    Patient Active Problem List    Diagnosis Date Noted     Major depression 2014     Priority: High     Unspecified disruptive, impulse-control, and conduct disorder 2014     Priority: High     Parent-child relational problem 2014     Priority: High     Anemia due to blood loss, acute 2022     Priority: Medium      (spontaneous vaginal delivery) 07/10/2022     Priority: Medium     Cannabis use disorder, mild 2018     Priority: Medium     Alcohol use disorder, mild, in sustained remission 2018     Priority: Medium     Vitamin D deficiency 2018     Priority: Medium     History of Attention-deficit/hyperactivity disorder 2018     Priority: Medium     Unspecified trauma- and stressor-related disorder 2018     Priority: Medium     Amphetamine-type substance use disorder, severe 2018     Priority: Medium     Intermittent asthma 2014 "     Priority: Medium     Attention deficit hyperactivity disorder (ADHD) 04/04/2014     Priority: Medium     Problem list name updated by automated process. Provider to review       Methamphetamine abuse (H) 04/04/2014     Priority: Medium     Unspecified depressive disorder 04/03/2014     Priority: Medium     Tobacco use disorder, mild 01/10/2014     Priority: Medium        Past Medical History:    Past Medical History:   Diagnosis Date     ADHD      Anxiety      Asthma      Bipolar disorder      Depression      History of urinary tract infection      IV drug user      Methamphetamine abuse      Oppositional defiant disorder        Past Surgical History:    Past Surgical History:   Procedure Laterality Date     NO HISTORY OF SURGERY         Family History:    Family History   Problem Relation Age of Onset     Substance Abuse Mother         drugs     Depression Mother      Anxiety Disorder Mother      Substance Abuse Father         drugs     Depression Father      Heart Disease Maternal Grandmother         MIx3     Substance Abuse Paternal Grandmother         alcohol     Substance Abuse Paternal Grandfather         alcohol     Liver Disease Paternal Grandfather      Alcoholism Paternal Grandfather      Substance Abuse Maternal Uncle      Depression Maternal Uncle      Anxiety Disorder Maternal Uncle      Bipolar Disorder Maternal Uncle      Substance Abuse Paternal Uncle        Social History:  Marital Status:  Single [1]  Social History     Tobacco Use     Smoking status: Every Day     Packs/day: 1.00     Types: Cigarettes     Smokeless tobacco: Never     Tobacco comments:     down to 2-3 cig/day or vaping ? with pregnancy   Vaping Use     Vaping Use: Former   Substance Use Topics     Alcohol use: No     Drug use: Yes     Types: Methamphetamines     Comment: iv meth- was in treatment but left yesterday- last use 1/16/21        Medications:    levonorgestrel (MIRENA) 20 MCG/DAY IUD  metroNIDAZOLE (METROGEL) 0.75 %  vaginal gel          Review of Systems   Constitutional: Negative.    Genitourinary: Positive for vaginal bleeding and vaginal discharge.   All other systems reviewed and are negative.      Physical Exam   BP: 139/79  Pulse: 72  Temp: 97.3  F (36.3  C)  Resp: 16  SpO2: 100 %      Physical Exam  Exam conducted with a chaperone present.   Constitutional:       General: She is not in acute distress.     Appearance: Normal appearance. She is not ill-appearing, toxic-appearing or diaphoretic.   HENT:      Head: Normocephalic and atraumatic.   Eyes:      Conjunctiva/sclera: Conjunctivae normal.   Pulmonary:      Effort: Pulmonary effort is normal.   Abdominal:      General: There is no distension.      Palpations: Abdomen is soft.      Tenderness: There is no abdominal tenderness. There is no guarding or rebound.   Genitourinary:     Labia:         Right: No rash or lesion.         Left: No rash or lesion.       Vagina: No signs of injury and foreign body. Vaginal discharge present. No erythema, tenderness or lesions.      Cervix: Normal.      Uterus: Normal.       Adnexa:         Right: No tenderness.          Left: No tenderness.     Musculoskeletal:      Cervical back: Neck supple.   Skin:     General: Skin is warm and dry.   Neurological:      Mental Status: She is alert.         ED Course               Procedures    Results for orders placed or performed during the hospital encounter of 01/13/23 (from the past 24 hour(s))   Wet prep    Specimen: Vagina; Swab   Result Value Ref Range    Trichomonas Absent Absent    Yeast Absent Absent    Clue Cells Present (A) Absent    WBCs/high power field 2+ (A) None   UA with Microscopic reflex to Culture    Specimen: Urine, Clean Catch   Result Value Ref Range    Color Urine Yellow Colorless, Straw, Light Yellow, Yellow    Appearance Urine Clear Clear    Glucose Urine Negative Negative mg/dL    Bilirubin Urine Negative Negative    Ketones Urine Negative Negative mg/dL    Specific  "Mcgregor Urine 1.020 1.003 - 1.035    Blood Urine Negative Negative    pH Urine 7.0 5.0 - 7.0    Protein Albumin Urine Negative Negative mg/dL    Urobilinogen Urine Normal Normal, 2.0 mg/dL    Nitrite Urine Negative Negative    Leukocyte Esterase Urine Negative Negative    Mucus Urine Present (A) None Seen /LPF    RBC Urine 0 <=2 /HPF    WBC Urine 1 <=5 /HPF    Squamous Epithelials Urine 1 <=1 /HPF    Narrative    Urine Culture not indicated       Medications   cefTRIAXone (ROCEPHIN) 500 mg in lidocaine injection (500 mg Intramuscular Given 1/13/23 1532)   azithromycin (ZITHROMAX) tablet 1,000 mg (1,000 mg Oral Given 1/13/23 1528)       Assessments & Plan (with Medical Decision Making)     Pt is a 22 year old female who presents with complaints of possible STD exposure.  Patient states she had unprotected sex with her boyfriend and her best friend.  Patient states her boyfriend is known to be \"sleeping around.\"  She is concerned she may have gonorrhea.  Patient complains of a dark brown vaginal discharge and intermittent vaginal spotting especially after intercourse.      Pt is afebrile on arrival.  Exam as above.  Gonorrhea and Chlamydia PCRs are pending.  Wet prep was positive for clue cells.  Wet prep was negative for trichomonas or yeast.  Urinalysis was negative for infection or hematuria.  Discussed results with patient.  Discussed with patient and opted to prophylactically treat with IM Rocephin and oral Azithromycin given high concern for potential STD exposure.  Patient instructed to follow-up within at least 2 to 3 months for repeat STD screening.  Return precautions were reviewed.  Hand-outs were provided.    Patient was sent with Flagyl for BV treatment and was instructed to follow-up with PCP in 3-5 days for continued care and management or sooner if new or worsening symptoms.  She is to return to the ED for persistent and/or worsening symptoms.  Patient expressed understanding of the diagnosis and plan " and was discharged home in good condition.    I have reviewed the nursing notes.    I have reviewed the findings, diagnosis, plan and need for follow up with the patient.    Discharge Medication List as of 1/13/2023  3:46 PM      START taking these medications    Details   metroNIDAZOLE (METROGEL) 0.75 % vaginal gel Place 1 applicator (5 g) vaginally daily for 5 daysDisp-25 g, [id number]             Final diagnoses:   Possible exposure to STD   Bacterial vaginosis       1/13/2023   [facility] EMERGENCY DEPT      Disclaimer:  This note consists of symbols derived from keyboarding, dictation and/or voice recognition software.  As a result, there may be errors in the script that have gone undetected.  Please consider this when interpreting information found in this chart.     Brinda Wells PA-C  01/13/23 [id number]

## 2023-01-14 LAB
C TRACH DNA SPEC QL NAA+PROBE: NEGATIVE
N GONORRHOEA DNA SPEC QL NAA+PROBE: NEGATIVE

## 2023-06-03 ENCOUNTER — HEALTH MAINTENANCE LETTER (OUTPATIENT)
Age: 23
End: 2023-06-03

## 2023-09-01 ENCOUNTER — HOSPITAL ENCOUNTER (EMERGENCY)
Facility: CLINIC | Age: 23
Discharge: HOME | End: 2023-09-01
Attending: PHYSICIAN ASSISTANT | Admitting: PHYSICIAN ASSISTANT
Payer: COMMERCIAL

## 2023-09-01 VITALS
RESPIRATION RATE: 16 BRPM | TEMPERATURE: 99.2 F | DIASTOLIC BLOOD PRESSURE: 61 MMHG | HEART RATE: 81 BPM | OXYGEN SATURATION: 98 % | SYSTOLIC BLOOD PRESSURE: 135 MMHG

## 2023-09-01 DIAGNOSIS — L40.9 PSORIASIS: ICD-10-CM

## 2023-09-01 PROCEDURE — G0463 HOSPITAL OUTPT CLINIC VISIT: HCPCS | Performed by: PHYSICIAN ASSISTANT

## 2023-09-01 PROCEDURE — 99213 OFFICE O/P EST LOW 20 MIN: CPT | Performed by: PHYSICIAN ASSISTANT

## 2023-09-01 RX ORDER — TRIAMCINOLONE ACETONIDE 1 MG/G
CREAM TOPICAL 2 TIMES DAILY
Qty: 80 G | Refills: 0 | Status: SHIPPED | OUTPATIENT
Start: 2023-09-01 | End: 2023-09-15

## 2023-09-01 RX ORDER — CEPHALEXIN 500 MG/1
500 CAPSULE ORAL 4 TIMES DAILY
Qty: 28 CAPSULE | Refills: 0 | Status: SHIPPED | OUTPATIENT
Start: 2023-09-01 | End: 2023-09-08

## 2023-09-01 RX ORDER — FLUOCINONIDE TOPICAL SOLUTION USP, 0.05% 0.5 MG/ML
SOLUTION TOPICAL
COMMUNITY
Start: 2023-08-11

## 2023-09-01 RX ORDER — PREDNISONE 20 MG/1
TABLET ORAL
Qty: 20 TABLET | Refills: 0 | Status: SHIPPED | OUTPATIENT
Start: 2023-09-01 | End: 2023-09-01

## 2023-09-01 RX ORDER — TRIAMCINOLONE ACETONIDE 1 MG/G
CREAM TOPICAL
COMMUNITY
Start: 2023-07-28 | End: 2023-09-01

## 2023-09-01 RX ORDER — PREDNISONE 20 MG/1
TABLET ORAL
Qty: 27 TABLET | Refills: 0 | Status: SHIPPED | OUTPATIENT
Start: 2023-09-01 | End: 2023-09-19

## 2023-09-01 ASSESSMENT — ENCOUNTER SYMPTOMS
CARDIOVASCULAR NEGATIVE: 1
RESPIRATORY NEGATIVE: 1
GASTROINTESTINAL NEGATIVE: 1
CONSTITUTIONAL NEGATIVE: 1

## 2023-09-01 NOTE — DISCHARGE INSTRUCTIONS
Prescribed triamcinolone ointment, to be applied twice per day for up to 14 days.  Recommend taking a shower, then apply triamcinolone and placing Saran wrap over affected areas (May be most convenient to apply triamcinolone and apply Saran wrap over the steroid cream at night).  Also starting a prednisone taper.    Do not take ibuprofen while taking prednisone to avoid excess GI irritation.    May take Tylenol for symptomatic relief of pain as desired.    Seek urgent medical evaluation if you develop fevers, concerns of breathing or swallowing, facial swelling, acutely worsening rash, or uncontrolled pain.

## 2023-09-01 NOTE — ED PROVIDER NOTES
History     Chief Complaint   Patient presents with    Derm Problem     Patient states she has psoriasis. It is all over her body. It burns at night.      WILLIAM Prakash is a 23 year old female with a past medical history of substance abuse, tobacco use disorder, ADHD, intermittent asthma, depression who presents for evaluation of a rash over her entire body which is been worsening over the past 2 months.  Rash began on her legs, has been diagnosed as psoriasis on two clinical presentations over the past 2 months.  Rashes been spreading to the back, groin, buttocks, arms and face over the past 2 to 4 weeks.  She initially had concerns for scabies due to exposure from her son, tried permethrin with limited relief.  Was evaluated at 1 month ago, diagnosed with psoriasis and started on triamcinolone which she has been applying 3-4 times per day with limited relief.  She was also started fluocinonide solution for treatment of rash on the scalp in 2023.  She denies any recent medication changes other than the topical steroids.  She has been scratching the lower extremities where the rash is the worst, and due to itchiness.  States that the rash is painful.  No concerns of breathing or swallowing, no chest pain or shortness of breath.  Has a previous history of eczema but has never had psoriasis previously.    Allergies:  No Known Allergies    Problem List:    Patient Active Problem List    Diagnosis Date Noted    Major depression 2014     Priority: High    Unspecified disruptive, impulse-control, and conduct disorder 2014     Priority: High    Parent-child relational problem 2014     Priority: High    Anemia due to blood loss, acute 2022     Priority: Medium     (spontaneous vaginal delivery) 07/10/2022     Priority: Medium    Cannabis use disorder, mild 2018     Priority: Medium    Alcohol use disorder, mild, in sustained remission 2018     Priority: Medium     Vitamin D deficiency 04/03/2018     Priority: Medium    History of Attention-deficit/hyperactivity disorder 04/02/2018     Priority: Medium    Unspecified trauma- and stressor-related disorder 04/02/2018     Priority: Medium    Amphetamine-type substance use disorder, severe 04/01/2018     Priority: Medium    Intermittent asthma 07/21/2014     Priority: Medium    Attention deficit hyperactivity disorder (ADHD) 04/04/2014     Priority: Medium     Problem list name updated by automated process. Provider to review      Methamphetamine abuse (H) 04/04/2014     Priority: Medium    Unspecified depressive disorder 04/03/2014     Priority: Medium    Tobacco use disorder, mild 01/10/2014     Priority: Medium        Past Medical History:    Past Medical History:   Diagnosis Date    ADHD     Anxiety     Asthma     Bipolar disorder     Depression     History of urinary tract infection     IV drug user     Methamphetamine abuse     Oppositional defiant disorder        Past Surgical History:    Past Surgical History:   Procedure Laterality Date    NO HISTORY OF SURGERY         Family History:    Family History   Problem Relation Age of Onset    Substance Abuse Mother         drugs    Depression Mother     Anxiety Disorder Mother     Substance Abuse Father         drugs    Depression Father     Heart Disease Maternal Grandmother         MIx3    Substance Abuse Paternal Grandmother         alcohol    Substance Abuse Paternal Grandfather         alcohol    Liver Disease Paternal Grandfather     Alcoholism Paternal Grandfather     Substance Abuse Maternal Uncle     Depression Maternal Uncle     Anxiety Disorder Maternal Uncle     Bipolar Disorder Maternal Uncle     Substance Abuse Paternal Uncle        Social History:  Marital Status:  Single [1]  Social History     Tobacco Use    Smoking status: Every Day     Packs/day: 1.00     Types: Cigarettes    Smokeless tobacco: Never    Tobacco comments:     down to 2-3 cig/day or vaping ?  with pregnancy   Vaping Use    Vaping Use: Former   Substance Use Topics    Alcohol use: No    Drug use: Yes     Types: Methamphetamines     Comment: iv meth- was in treatment but left yesterday- last use 1/16/21        Medications:    fluocinonide (LIDEX) 0.05 % external solution  levonorgestrel (MIRENA) 20 MCG/DAY IUD  predniSONE (DELTASONE) 20 MG tablet  triamcinolone (KENALOG) 0.1 % external cream        Review of Systems   Constitutional: Negative.    Respiratory: Negative.     Cardiovascular: Negative.    Gastrointestinal: Negative.    Skin:  Positive for rash.       Physical Exam   BP: 135/61  Pulse: 81  Temp: 99.2  F (37.3  C)  Resp: 16  SpO2: 98 %      Physical Exam  Constitutional:       General: She is not in acute distress.     Appearance: Normal appearance. She is not ill-appearing, toxic-appearing or diaphoretic.   HENT:      Head: Normocephalic and atraumatic.      Mouth/Throat:      Mouth: Mucous membranes are moist.      Pharynx: Oropharynx is clear. No oropharyngeal exudate or posterior oropharyngeal erythema.   Cardiovascular:      Pulses: Normal pulses.      Heart sounds: Normal heart sounds.   Pulmonary:      Effort: Pulmonary effort is normal. No respiratory distress.      Breath sounds: Normal breath sounds. No stridor. No wheezing, rhonchi or rales.   Chest:      Chest wall: No tenderness.   Musculoskeletal:      Cervical back: Normal range of motion and neck supple.   Skin:     General: Skin is warm and dry.      Findings: Lesion and rash present.      Comments: Raised erythematous lesions with silver-colored plaques on the anterior aspect of the legs.  Erythematous lesions are present elsewhere on the trunk, buttocks, back, upper extremities.  There are some plaques present on the back lesions. Other lesions on the buttocks and trunk and upper extremities are erythematous, do not suzanne to pressure.  Multiple excoriations and scabbed over lesions on the lower extremities.   Neurological:       Mental Status: She is alert.         ED Course                 Procedures         No results found for this or any previous visit (from the past 24 hour(s)).    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  She has been seen in clinic several times for this concern, given steroid creams but has had limited relief.  He has a dermatology referral for 29 September but does not want to wait that long.  Presentation is still consistent with guttate psoriasis which is worsening.  Prescribed triamcinolone ointment, to be applied twice per day for up to 14 days.  Recommend taking a shower, then apply triamcinolone and placing Saran wrap over affected areas (May be most convenient to apply triamcinolone and apply Saran wrap over the steroid cream at night).  Also starting a prednisone taper considering the patient has had an acutely worsening rash which has features of dermatitis.  Side effects of prednisone discussed.    Discussed the prednisone taper with rheumatology, who recommended an extended course with slower step downs to extend over a 23 to 25-day timeframe.  I initially prescribed a 12-day taper but subsequently changed it to a 23-day extended taper after discussing with dermatology.  Called and discussed with the patient who verbalized understanding.  Discussed the prescription change with the pharmacy as well.  The pharmacist agreed to print a new label describing the taper intervals and will provide additional tablets.       She has no worrisome findings on exam today such as fevers, facial swelling or tongue swelling, vital signs are reassuring and within normal limits.    Also prescribed oral Keflex due to concern for superimposed infection of the lower extremities given multiple excoriations and scabbed over lesions with mild erythema.      Provided an urgent referral to dermatology for  further evaluation and management.  May need an oral medication such as a biologic.  Also provided referral to primary care for further evaluation and management.    May take Tylenol for symptomatic relief of pain as desired.    Return precautions discussed including worrisome signs and symptoms that would warrant urgent medical evaluation.       I have reviewed the nursing notes.    I have reviewed the findings, diagnosis, plan and need for follow up with the patient.    New Prescriptions    PREDNISONE (DELTASONE) 20 MG TABLET    Take 3 tabs by mouth daily x 3 days, then 2 tabs daily x 3 days, then 1 tab daily x 3 days, then 1/2 tab daily x 3 days.       Final diagnoses:   Psoriasis       9/1/2023   [facility] EMERGENCY DEPT       Epifanio Al PA-C  09/01/23 1941       Epifanio Al PA-C  09/01/23 2026

## 2023-09-14 ENCOUNTER — OFFICE VISIT (OUTPATIENT)
Dept: FAMILY MEDICINE | Facility: CLINIC | Age: 23
End: 2023-09-14
Attending: PHYSICIAN ASSISTANT
Payer: COMMERCIAL

## 2023-09-14 VITALS
BODY MASS INDEX: 27.13 KG/M2 | WEIGHT: 179 LBS | OXYGEN SATURATION: 98 % | SYSTOLIC BLOOD PRESSURE: 118 MMHG | DIASTOLIC BLOOD PRESSURE: 72 MMHG | HEART RATE: 82 BPM | RESPIRATION RATE: 20 BRPM | HEIGHT: 68 IN

## 2023-09-14 DIAGNOSIS — L40.9 PSORIASIS: ICD-10-CM

## 2023-09-14 DIAGNOSIS — F15.20 METHAMPHETAMINE USE DISORDER, SEVERE (H): ICD-10-CM

## 2023-09-14 DIAGNOSIS — L40.9 SCALP PSORIASIS: Primary | ICD-10-CM

## 2023-09-14 DIAGNOSIS — F31.9 BIPOLAR 1 DISORDER (H): ICD-10-CM

## 2023-09-14 PROCEDURE — 99214 OFFICE O/P EST MOD 30 MIN: CPT | Performed by: PHYSICIAN ASSISTANT

## 2023-09-14 RX ORDER — LURASIDONE HYDROCHLORIDE 20 MG/1
20 TABLET, FILM COATED ORAL
COMMUNITY
Start: 2023-05-05

## 2023-09-14 RX ORDER — BETAMETHASONE DIPROPIONATE 0.5 MG/G
OINTMENT, AUGMENTED TOPICAL 2 TIMES DAILY
Qty: 50 G | Refills: 2 | Status: SHIPPED | OUTPATIENT
Start: 2023-09-14

## 2023-09-14 RX ORDER — CLOBETASOL PROPIONATE 0.05 G/100ML
SHAMPOO TOPICAL
Qty: 118 ML | Refills: 0 | Status: SHIPPED | OUTPATIENT
Start: 2023-09-14

## 2023-09-14 RX ORDER — DEXTROAMPHETAMINE SACCHARATE, AMPHETAMINE ASPARTATE MONOHYDRATE, DEXTROAMPHETAMINE SULFATE AND AMPHETAMINE SULFATE 7.5; 7.5; 7.5; 7.5 MG/1; MG/1; MG/1; MG/1
30 CAPSULE, EXTENDED RELEASE ORAL
COMMUNITY
Start: 2023-09-07

## 2023-09-14 RX ORDER — PREDNISONE 20 MG/1
TABLET ORAL
Qty: 20 TABLET | Refills: 0 | Status: SHIPPED | OUTPATIENT
Start: 2023-09-14

## 2023-09-14 ASSESSMENT — PAIN SCALES - GENERAL: PAINLEVEL: MILD PAIN (3)

## 2023-09-14 ASSESSMENT — PATIENT HEALTH QUESTIONNAIRE - PHQ9
SUM OF ALL RESPONSES TO PHQ QUESTIONS 1-9: 1
10. IF YOU CHECKED OFF ANY PROBLEMS, HOW DIFFICULT HAVE THESE PROBLEMS MADE IT FOR YOU TO DO YOUR WORK, TAKE CARE OF THINGS AT HOME, OR GET ALONG WITH OTHER PEOPLE: NOT DIFFICULT AT ALL
SUM OF ALL RESPONSES TO PHQ QUESTIONS 1-9: 1

## 2023-09-14 ASSESSMENT — ASTHMA QUESTIONNAIRES: ACT_TOTALSCORE: 25

## 2023-09-14 NOTE — PROGRESS NOTES
"  Assessment & Plan     Scalp psoriasis  Psoriasis  Severe disease. Will restart Prednisone for symptomatic relief. Pt will look for a Dermatologist outside of [place] due to the time it takes to get into [place]. Offered E consult, but she defferred at this time. Rx for Clobetasol shampoo and betamethasone ointment as well.   - predniSONE (DELTASONE) 20 MG tablet; Take 3 tabs by mouth daily x 3 days, then 2 tabs daily x 3 days, then 1 tab daily x 3 days, then 1/2 tab daily x 3 days.  - clobetasol propionate (CLOBEX) 0.05 % external shampoo; Apply thin film to dry scalp once daily (maximum dose: 50 mL/week); leave in place for 15 minutes, then add water, lather, and rinse thoroughly. Limit treatment to 4 consecutive weeks. Shampoo should last 4 weeks.    Bipolar 1 disorder (H)  Has PCP with [facility]. Previously been on Latuda but no longer. Will continue to follow-up with her PCP and/or Psychiatry.     Methamphetamine use disorder, severe (H)  Has been sober 60 days now. Follow-up with PCP and Addiction with [facility]. Will assist as needed.      MED REC REQUIRED  Post Medication Reconciliation Status:  Patient was not discharged from an inpatient facility or TCU  Nicotine/Tobacco Cessation:  She reports that she has been smoking vaping device. She has never used smokeless tobacco.  Nicotine/Tobacco Cessation Plan:       BMI:   Estimated body mass index is 27.22 kg/m  as calculated from the following:    Height as of this encounter: 1.727 m (5' 8\").    Weight as of this encounter: 81.2 kg (179 lb).       Trenton Hernandez PA-C  [facility] is a 23 year old, presenting for the following health issues:  ER F/U        9/14/2023     8:55 AM   Additional Questions   Roomed by Eleni CASSIDY   Accompanied by Grandma, friend and son       HPI   ED/UC Followup:  Facility:  [place]  Date of visit: 09/01/2023  Reason for visit: Rash, Psoriasis  Current Status: worse      Review of " "Systems   See HPI       Objective    /72 (BP Location: Right arm, Patient Position: Sitting, Cuff Size: Adult Regular)   Pulse 82   Resp 20   Ht 1.727 m (5' 8\")   Wt 81.2 kg (179 lb)   SpO2 98%   BMI 27.22 kg/m    Body mass index is 27.22 kg/m .  Physical Exam   Constitutional: healthy, alert, and no distress  Head: Normocephalic. Atraumatic  Eyes: No conjunctival injection, sclera anicteric  Respiratory: No resp distress.  Musculoskeletal: extremities normal- no gross deformities noted, and normal muscle tone  Neurologic: Gait normal. CN 2-12 grossly intact  Psychiatric: mentation appears normal and affect normal/bright   Skin: Severe scaly erythematous plaques located on the bilateral lower extremities, back, and a few on the face/scalp.         Answers submitted by the patient for this visit:  Patient Health Questionnaire (Submitted on 9/14/2023)  If you checked off any problems, how difficult have these problems made it for you to do your work, take care of things at home, or get along with other people?: Not difficult at all  PHQ9 TOTAL SCORE: 1    "

## 2023-09-15 PROBLEM — F31.9 BIPOLAR 1 DISORDER (H): Status: ACTIVE | Noted: 2023-09-15

## 2023-09-15 NOTE — PATIENT INSTRUCTIONS
Restart Prednsone.     Use creams and shampoo, but not on face.     Follow-up with Dermatology. Its unlikely you need a referral, but I can help with this if you need.     Follow-up with PCP for chronic medical conditions.

## 2024-07-07 ENCOUNTER — HEALTH MAINTENANCE LETTER (OUTPATIENT)
Age: 24
End: 2024-07-07

## 2024-11-23 ENCOUNTER — OFFICE VISIT (OUTPATIENT)
Dept: URGENT CARE | Facility: URGENT CARE | Age: 24
End: 2024-11-23

## 2024-11-23 VITALS
BODY MASS INDEX: 35.73 KG/M2 | SYSTOLIC BLOOD PRESSURE: 122 MMHG | DIASTOLIC BLOOD PRESSURE: 68 MMHG | WEIGHT: 235 LBS | HEART RATE: 95 BPM | RESPIRATION RATE: 16 BRPM | OXYGEN SATURATION: 97 % | TEMPERATURE: 98.6 F

## 2024-11-23 DIAGNOSIS — R30.0 DYSURIA: ICD-10-CM

## 2024-11-23 DIAGNOSIS — N30.00 ACUTE CYSTITIS WITHOUT HEMATURIA: Primary | ICD-10-CM

## 2024-11-23 LAB
ALBUMIN UR-MCNC: 30 MG/DL
APPEARANCE UR: ABNORMAL
BACTERIA #/AREA URNS HPF: ABNORMAL /HPF
BILIRUB UR QL STRIP: NEGATIVE
CLUE CELLS: NORMAL
COLOR UR AUTO: YELLOW
GLUCOSE UR STRIP-MCNC: NEGATIVE MG/DL
HGB UR QL STRIP: ABNORMAL
KETONES UR STRIP-MCNC: NEGATIVE MG/DL
LEUKOCYTE ESTERASE UR QL STRIP: ABNORMAL
NITRATE UR QL: NEGATIVE
PH UR STRIP: 5.5 [PH] (ref 5–7)
RBC #/AREA URNS AUTO: ABNORMAL /HPF
SP GR UR STRIP: 1.02 (ref 1–1.03)
SQUAMOUS #/AREA URNS AUTO: ABNORMAL /LPF
TRICHOMONAS, WET PREP: NORMAL
UROBILINOGEN UR STRIP-ACNC: 0.2 E.U./DL
WBC #/AREA URNS AUTO: ABNORMAL /HPF
WBC'S/HIGH POWER FIELD, WET PREP: NORMAL
YEAST, WET PREP: NORMAL

## 2024-11-23 PROCEDURE — 99214 OFFICE O/P EST MOD 30 MIN: CPT | Performed by: NURSE PRACTITIONER

## 2024-11-23 PROCEDURE — 81001 URINALYSIS AUTO W/SCOPE: CPT | Performed by: NURSE PRACTITIONER

## 2024-11-23 PROCEDURE — 87086 URINE CULTURE/COLONY COUNT: CPT | Performed by: NURSE PRACTITIONER

## 2024-11-23 PROCEDURE — 87210 SMEAR WET MOUNT SALINE/INK: CPT | Performed by: NURSE PRACTITIONER

## 2024-11-23 PROCEDURE — 87088 URINE BACTERIA CULTURE: CPT | Performed by: NURSE PRACTITIONER

## 2024-11-23 RX ORDER — ALBUTEROL SULFATE 90 UG/1
1-2 INHALANT RESPIRATORY (INHALATION)
COMMUNITY
Start: 2024-08-16

## 2024-11-23 RX ORDER — VALACYCLOVIR HYDROCHLORIDE 1 G/1
TABLET, FILM COATED ORAL
COMMUNITY
Start: 2024-10-23

## 2024-11-23 RX ORDER — NITROFURANTOIN 25; 75 MG/1; MG/1
100 CAPSULE ORAL 2 TIMES DAILY
Qty: 10 CAPSULE | Refills: 0 | Status: SHIPPED | OUTPATIENT
Start: 2024-11-23 | End: 2024-11-28

## 2024-11-23 RX ORDER — APREMILAST 10-20-30MG
KIT ORAL
COMMUNITY
Start: 2024-07-24

## 2024-11-23 RX ORDER — TRIAMCINOLONE ACETONIDE 1 MG/G
CREAM TOPICAL
COMMUNITY
Start: 2024-02-13

## 2024-11-23 NOTE — PROGRESS NOTES
SUBJECTIVE:   Gibson Prakash is a 24 year old female who  presents today for a possible UTI.   Symptoms of dysuria and frequency have been going on for 3week(s).  Hematuria no.  gradual onset and mild.  There is no history of fever, chills, nausea or vomiting.  No history of vaginal discharge. This patient does not have a history of urinary tract infections.     Past Medical History:   Diagnosis Date    ADHD     Anxiety     Asthma     Bipolar disorder     Depression     History of urinary tract infection     IV drug user     Methamphetamine abuse     Oppositional defiant disorder      Current Outpatient Medications   Medication Sig Dispense Refill    albuterol (PROAIR HFA/PROVENTIL HFA/VENTOLIN HFA) 108 (90 Base) MCG/ACT inhaler Inhale 1-2 puffs into the lungs.      augmented betamethasone dipropionate (DIPROLENE-AF) 0.05 % external ointment Apply topically 2 times daily Tube should last 2 weeks. Do not apply to the face. 50 g 2    clobetasol propionate (CLOBEX) 0.05 % external shampoo Apply thin film to dry scalp once daily (maximum dose: 50 mL/week); leave in place for 15 minutes, then add water, lather, and rinse thoroughly. Limit treatment to 4 consecutive weeks. Shampoo should last 4 weeks. 118 mL 0    fluocinonide (LIDEX) 0.05 % external solution Apply a thin layer daily as needed to the scalp      levonorgestrel (MIRENA) 20 MCG/DAY IUD 1 each (20 mcg) by Intrauterine route once      OTEZLA 10 & 20 & 30 MG TBPK       triamcinolone (KENALOG) 0.1 % external cream       valACYclovir (VALTREX) 1000 mg tablet Take 1 pill two times daily for 3 days at onset of herpes outbreak.      amphetamine-dextroamphetamine (ADDERALL XR) 30 MG 24 hr capsule Take 30 mg by mouth (Patient not taking: Reported on 11/23/2024)      lurasidone (LATUDA) 20 MG TABS tablet Take 20 mg by mouth (Patient not taking: Reported on 11/23/2024)      predniSONE (DELTASONE) 20 MG tablet Take 3 tabs by mouth daily x 3 days, then 2 tabs daily x 3  days, then 1 tab daily x 3 days, then 1/2 tab daily x 3 days. (Patient not taking: Reported on 11/23/2024) 20 tablet 0     Social History     Tobacco Use    Smoking status: Every Day     Types: Vaping Device    Smokeless tobacco: Never    Tobacco comments:     down to 2-3 cig/day or vaping ? with pregnancy   Substance Use Topics    Alcohol use: No           OBJECTIVE:  /68   Pulse 95   Temp 98.6  F (37  C) (Tympanic)   Resp 16   Wt 106.6 kg (235 lb)   SpO2 97%   BMI 35.73 kg/m    GENERAL APPEARANCE: healthy, alert and no distress  RESP: lungs clear to auscultation - no rales, rhonchi or wheezes  CV: regular rates and rhythm, normal S1 S2, no murmur noted  BACK: No CVA tenderness  SKIN: no suspicious lesions or rashes    Recent Results (from the past week)   Wet prep - Clinic Collect    Specimen: Vagina; Swab   Result Value Ref Range Status    Trichomonas Absent Absent Final    Yeast Absent Absent Final    Clue Cells Absent Absent Final    WBCs/high power field None None Final   UA Macroscopic with reflex to Microscopic and Culture - Clinic Collect    Specimen: Urine, Clean Catch   Result Value Ref Range Status    Color Urine Yellow Colorless, Straw, Light Yellow, Yellow Final    Appearance Urine Cloudy (A) Clear Final    Glucose Urine Negative Negative mg/dL Final    Bilirubin Urine Negative Negative Final    Ketones Urine Negative Negative mg/dL Final    Specific Gravity Urine 1.025 1.003 - 1.035 Final    Blood Urine Moderate (A) Negative Final    pH Urine 5.5 5.0 - 7.0 Final    Protein Albumin Urine 30 (A) Negative mg/dL Final    Urobilinogen Urine 0.2 0.2, 1.0 E.U./dL Final    Nitrite Urine Negative Negative Final    Leukocyte Esterase Urine Large (A) Negative Final     *Note: Due to a large number of results and/or encounters for the requested time period, some results have not been displayed. A complete set of results can be found in Results Review.         ASSESSMENT:   1. Acute cystitis without  hematuria (Primary)    - nitroFURantoin macrocrystal-monohydrate (MACROBID) 100 MG capsule; Take 1 capsule (100 mg) by mouth 2 times daily for 5 days.  Dispense: 10 capsule; Refill: 0    2. Dysuria    - Wet prep - Clinic Collect  - UA Macroscopic with reflex to Microscopic and Culture - Clinic Collect  - Urine Microscopic Exam  - Urine Culture      PLAN:  1. Increase fluid intake  2. Complete antibiotic regimen as prescribed. You will be notified if the treatment plan needs to be changed based on the urine culture results.   3. Follow if you have a fever of 100.4 F (38 C) or higher, no improvement after three days of treatment, trouble urinating because of pain,new or increased discharge from the vagina, rash or joint pain, Increased back or abdominal pain.

## 2024-11-24 LAB
BACTERIA UR CULT: ABNORMAL
BACTERIA UR CULT: ABNORMAL

## 2025-07-13 ENCOUNTER — HEALTH MAINTENANCE LETTER (OUTPATIENT)
Age: 25
End: 2025-07-13

## (undated) RX ORDER — FENTANYL CITRATE 50 UG/ML
INJECTION, SOLUTION INTRAMUSCULAR; INTRAVENOUS
Status: DISPENSED
Start: 2022-07-10